# Patient Record
Sex: FEMALE | Race: WHITE | Employment: FULL TIME | URBAN - METROPOLITAN AREA
[De-identification: names, ages, dates, MRNs, and addresses within clinical notes are randomized per-mention and may not be internally consistent; named-entity substitution may affect disease eponyms.]

---

## 2017-01-03 ENCOUNTER — HOSPITAL ENCOUNTER (OUTPATIENT)
Dept: PHYSICAL THERAPY | Age: 42
Discharge: HOME | End: 2017-01-03
Admitting: PHYSICAL MEDICINE & REHABILITATION

## 2017-01-09 ENCOUNTER — OFFICE VISIT (OUTPATIENT)
Dept: DERMATOLOGY | Age: 42
End: 2017-01-09

## 2017-01-09 DIAGNOSIS — L57.0 ACTINIC KERATOSIS OF RIGHT CHEEK: Primary | ICD-10-CM

## 2017-01-09 PROCEDURE — 17000 DESTRUCT PREMALG LESION: CPT | Performed by: DERMATOLOGY

## 2017-01-12 ENCOUNTER — HOSPITAL ENCOUNTER (OUTPATIENT)
Dept: MAMMOGRAPHY | Age: 42
Discharge: HOME | End: 2017-01-12
Attending: FAMILY MEDICINE | Admitting: FAMILY MEDICINE

## 2017-01-12 DIAGNOSIS — Z12.31 ENCOUNTER FOR SCREENING MAMMOGRAM FOR MALIGNANT NEOPLASM OF BREAST: ICD-10-CM

## 2017-01-12 DIAGNOSIS — Z12.31 ENCOUNTER FOR SCREENING MAMMOGRAM FOR BREAST CANCER: ICD-10-CM

## 2017-01-17 ENCOUNTER — HOSPITAL ENCOUNTER (OUTPATIENT)
Dept: PHYSICAL THERAPY | Age: 42
Discharge: HOME | End: 2017-01-17
Admitting: PHYSICAL MEDICINE & REHABILITATION

## 2017-01-18 ENCOUNTER — OFFICE VISIT (OUTPATIENT)
Dept: ORTHOPEDIC SURGERY | Age: 42
End: 2017-01-18

## 2017-01-18 VITALS
HEART RATE: 86 BPM | DIASTOLIC BLOOD PRESSURE: 70 MMHG | HEIGHT: 62 IN | BODY MASS INDEX: 37.73 KG/M2 | WEIGHT: 205 LBS | SYSTOLIC BLOOD PRESSURE: 125 MMHG

## 2017-01-18 DIAGNOSIS — M51.26 HNP (HERNIATED NUCLEUS PULPOSUS), LUMBAR: Primary | ICD-10-CM

## 2017-01-18 PROCEDURE — 99213 OFFICE O/P EST LOW 20 MIN: CPT | Performed by: PHYSICAL MEDICINE & REHABILITATION

## 2017-01-20 ENCOUNTER — HOSPITAL ENCOUNTER (OUTPATIENT)
Dept: PHYSICAL THERAPY | Age: 42
Discharge: HOME | End: 2017-01-20
Admitting: PHYSICAL MEDICINE & REHABILITATION

## 2017-01-24 ENCOUNTER — HOSPITAL ENCOUNTER (OUTPATIENT)
Dept: PHYSICAL THERAPY | Age: 42
Discharge: HOME | End: 2017-01-24
Admitting: PHYSICAL MEDICINE & REHABILITATION

## 2017-01-25 ENCOUNTER — TELEPHONE (OUTPATIENT)
Dept: ORTHOPEDIC SURGERY | Age: 42
End: 2017-01-25

## 2017-01-26 ENCOUNTER — OFFICE VISIT (OUTPATIENT)
Dept: ORTHOPEDIC SURGERY | Age: 42
End: 2017-01-26

## 2017-01-26 VITALS
HEART RATE: 99 BPM | WEIGHT: 205.03 LBS | SYSTOLIC BLOOD PRESSURE: 131 MMHG | BODY MASS INDEX: 37.73 KG/M2 | DIASTOLIC BLOOD PRESSURE: 83 MMHG | HEIGHT: 62 IN

## 2017-01-26 DIAGNOSIS — M51.16 LUMBAR DISC HERNIATION WITH RADICULOPATHY: Primary | ICD-10-CM

## 2017-01-26 PROCEDURE — 99243 OFF/OP CNSLTJ NEW/EST LOW 30: CPT | Performed by: ORTHOPAEDIC SURGERY

## 2017-01-27 ENCOUNTER — HOSPITAL ENCOUNTER (OUTPATIENT)
Dept: PHYSICAL THERAPY | Age: 42
Discharge: HOME | End: 2017-01-27
Admitting: PHYSICAL MEDICINE & REHABILITATION

## 2017-02-03 ENCOUNTER — HOSPITAL ENCOUNTER (OUTPATIENT)
Dept: PHYSICAL THERAPY | Age: 42
Discharge: HOME | End: 2017-02-03
Admitting: PHYSICAL MEDICINE & REHABILITATION

## 2017-02-03 ENCOUNTER — OFFICE VISIT (OUTPATIENT)
Dept: FAMILY MEDICINE CLINIC | Age: 42
End: 2017-02-03

## 2017-02-03 VITALS
WEIGHT: 205 LBS | BODY MASS INDEX: 37.73 KG/M2 | HEIGHT: 62 IN | DIASTOLIC BLOOD PRESSURE: 78 MMHG | HEART RATE: 112 BPM | OXYGEN SATURATION: 98 % | SYSTOLIC BLOOD PRESSURE: 124 MMHG

## 2017-02-03 DIAGNOSIS — Z00.00 ROUTINE GENERAL MEDICAL EXAMINATION AT A HEALTH CARE FACILITY: ICD-10-CM

## 2017-02-03 PROCEDURE — 99396 PREV VISIT EST AGE 40-64: CPT | Performed by: FAMILY MEDICINE

## 2017-02-03 RX ORDER — SERTRALINE HYDROCHLORIDE 100 MG/1
TABLET, FILM COATED ORAL
Qty: 180 TABLET | Refills: 3 | Status: SHIPPED | OUTPATIENT
Start: 2017-02-03 | End: 2018-02-04 | Stop reason: SDUPTHER

## 2017-02-03 RX ORDER — FLUTICASONE PROPIONATE 50 MCG
1 SPRAY, SUSPENSION (ML) NASAL DAILY
Qty: 3 BOTTLE | Refills: 3 | Status: SHIPPED | OUTPATIENT
Start: 2017-02-03 | End: 2019-04-18 | Stop reason: SDUPTHER

## 2017-02-03 RX ORDER — ALBUTEROL SULFATE 90 UG/1
2 AEROSOL, METERED RESPIRATORY (INHALATION) EVERY 6 HOURS PRN
Qty: 3 INHALER | Refills: 3 | Status: SHIPPED | OUTPATIENT
Start: 2017-02-03 | End: 2017-06-22 | Stop reason: SDUPTHER

## 2017-02-03 RX ORDER — BUSPIRONE HYDROCHLORIDE 15 MG/1
TABLET ORAL
Qty: 180 TABLET | Refills: 3 | Status: SHIPPED | OUTPATIENT
Start: 2017-02-03 | End: 2018-02-04 | Stop reason: SDUPTHER

## 2017-02-07 ENCOUNTER — HOSPITAL ENCOUNTER (OUTPATIENT)
Dept: PHYSICAL THERAPY | Age: 42
Discharge: HOME | End: 2017-02-07
Admitting: PHYSICAL MEDICINE & REHABILITATION

## 2017-02-09 ENCOUNTER — HOSPITAL ENCOUNTER (OUTPATIENT)
Dept: PHYSICAL THERAPY | Age: 42
Discharge: HOME | End: 2017-02-09
Admitting: PHYSICAL MEDICINE & REHABILITATION

## 2017-02-10 ENCOUNTER — TELEPHONE (OUTPATIENT)
Dept: FAMILY MEDICINE CLINIC | Age: 42
End: 2017-02-10

## 2017-02-10 RX ORDER — BUDESONIDE AND FORMOTEROL FUMARATE DIHYDRATE 160; 4.5 UG/1; UG/1
2 AEROSOL RESPIRATORY (INHALATION) 2 TIMES DAILY
Qty: 3 INHALER | Refills: 3 | Status: SHIPPED | OUTPATIENT
Start: 2017-02-10 | End: 2018-06-16 | Stop reason: SDUPTHER

## 2017-03-07 ENCOUNTER — TELEPHONE (OUTPATIENT)
Dept: PHARMACY | Facility: CLINIC | Age: 42
End: 2017-03-07

## 2017-03-09 ENCOUNTER — TELEPHONE (OUTPATIENT)
Dept: ENDOCRINOLOGY | Age: 42
End: 2017-03-09

## 2017-03-11 ENCOUNTER — PATIENT MESSAGE (OUTPATIENT)
Dept: ENDOCRINOLOGY | Age: 42
End: 2017-03-11

## 2017-03-23 ENCOUNTER — OFFICE VISIT (OUTPATIENT)
Dept: FAMILY MEDICINE CLINIC | Age: 42
End: 2017-03-23

## 2017-03-23 VITALS
OXYGEN SATURATION: 98 % | HEART RATE: 108 BPM | BODY MASS INDEX: 37.91 KG/M2 | HEIGHT: 62 IN | DIASTOLIC BLOOD PRESSURE: 70 MMHG | WEIGHT: 206 LBS | SYSTOLIC BLOOD PRESSURE: 130 MMHG

## 2017-03-23 DIAGNOSIS — M79.89 CALF SWELLING: Primary | ICD-10-CM

## 2017-03-23 DIAGNOSIS — M79.604 PAIN OF RIGHT LEG: ICD-10-CM

## 2017-03-23 PROCEDURE — 99213 OFFICE O/P EST LOW 20 MIN: CPT | Performed by: FAMILY MEDICINE

## 2017-03-23 ASSESSMENT — ENCOUNTER SYMPTOMS
RESPIRATORY NEGATIVE: 1
SHORTNESS OF BREATH: 0

## 2017-03-24 ENCOUNTER — HOSPITAL ENCOUNTER (OUTPATIENT)
Dept: VASCULAR LAB | Age: 42
Discharge: HOME | End: 2017-03-24
Attending: FAMILY MEDICINE | Admitting: FAMILY MEDICINE

## 2017-03-24 DIAGNOSIS — M79.89 OTHER SPECIFIED SOFT TISSUE DISORDERS: ICD-10-CM

## 2017-03-28 DIAGNOSIS — E11.9 TYPE 2 DIABETES MELLITUS WITHOUT COMPLICATION, WITHOUT LONG-TERM CURRENT USE OF INSULIN (HCC): Primary | ICD-10-CM

## 2017-04-25 ENCOUNTER — OFFICE VISIT (OUTPATIENT)
Dept: ENDOCRINOLOGY | Age: 42
End: 2017-04-25

## 2017-04-25 VITALS
BODY MASS INDEX: 37.76 KG/M2 | HEIGHT: 62 IN | DIASTOLIC BLOOD PRESSURE: 75 MMHG | OXYGEN SATURATION: 93 % | RESPIRATION RATE: 20 BRPM | SYSTOLIC BLOOD PRESSURE: 107 MMHG | WEIGHT: 205.2 LBS | HEART RATE: 100 BPM

## 2017-04-25 DIAGNOSIS — E11.9 TYPE 2 DIABETES MELLITUS WITHOUT COMPLICATION, WITHOUT LONG-TERM CURRENT USE OF INSULIN (HCC): Primary | ICD-10-CM

## 2017-04-25 DIAGNOSIS — E78.5 HYPERLIPIDEMIA, UNSPECIFIED HYPERLIPIDEMIA TYPE: ICD-10-CM

## 2017-04-25 LAB
A/G RATIO: 1.8 (ref 1.1–2.2)
ALBUMIN SERPL-MCNC: 4.6 G/DL (ref 3.4–5)
ALP BLD-CCNC: 72 U/L (ref 40–129)
ALT SERPL-CCNC: 19 U/L (ref 10–40)
ANION GAP SERPL CALCULATED.3IONS-SCNC: 17 MMOL/L (ref 3–16)
AST SERPL-CCNC: 20 U/L (ref 15–37)
BILIRUB SERPL-MCNC: 0.4 MG/DL (ref 0–1)
BUN BLDV-MCNC: 12 MG/DL (ref 7–20)
CALCIUM SERPL-MCNC: 9.7 MG/DL (ref 8.3–10.6)
CHLORIDE BLD-SCNC: 99 MMOL/L (ref 99–110)
CHOLESTEROL, TOTAL: 185 MG/DL (ref 0–199)
CO2: 24 MMOL/L (ref 21–32)
CREAT SERPL-MCNC: 0.6 MG/DL (ref 0.6–1.1)
ESTIMATED AVERAGE GLUCOSE: 165.7 MG/DL
GFR AFRICAN AMERICAN: >60
GFR NON-AFRICAN AMERICAN: >60
GLOBULIN: 2.6 G/DL
GLUCOSE BLD-MCNC: 142 MG/DL (ref 70–99)
HBA1C MFR BLD: 7.4 %
HDLC SERPL-MCNC: 71 MG/DL (ref 40–60)
LDL CHOLESTEROL CALCULATED: 84 MG/DL
POTASSIUM SERPL-SCNC: 5 MMOL/L (ref 3.5–5.1)
SODIUM BLD-SCNC: 140 MMOL/L (ref 136–145)
TOTAL PROTEIN: 7.2 G/DL (ref 6.4–8.2)
TRIGL SERPL-MCNC: 148 MG/DL (ref 0–150)
VLDLC SERPL CALC-MCNC: 30 MG/DL

## 2017-04-25 PROCEDURE — 99213 OFFICE O/P EST LOW 20 MIN: CPT | Performed by: INTERNAL MEDICINE

## 2017-04-28 ENCOUNTER — OFFICE VISIT (OUTPATIENT)
Dept: FAMILY MEDICINE CLINIC | Age: 42
End: 2017-04-28

## 2017-04-28 ENCOUNTER — NURSE ONLY (OUTPATIENT)
Dept: URGENT CARE | Age: 42
End: 2017-04-28

## 2017-04-28 VITALS
WEIGHT: 201 LBS | BODY MASS INDEX: 37.27 KG/M2 | DIASTOLIC BLOOD PRESSURE: 64 MMHG | OXYGEN SATURATION: 98 % | HEART RATE: 90 BPM | SYSTOLIC BLOOD PRESSURE: 102 MMHG

## 2017-04-28 DIAGNOSIS — R52 PAIN: Primary | ICD-10-CM

## 2017-04-28 DIAGNOSIS — M79.675 TOE PAIN, LEFT: Primary | ICD-10-CM

## 2017-04-28 PROCEDURE — 99213 OFFICE O/P EST LOW 20 MIN: CPT | Performed by: FAMILY MEDICINE

## 2017-05-09 RX ORDER — LIRAGLUTIDE 6 MG/ML
INJECTION SUBCUTANEOUS
Qty: 27 ML | Refills: 3 | Status: SHIPPED | OUTPATIENT
Start: 2017-05-09 | End: 2017-12-07

## 2017-05-12 DIAGNOSIS — M51.26 DISPLACEMENT OF LUMBAR INTERVERTEBRAL DISC WITHOUT MYELOPATHY: Primary | ICD-10-CM

## 2017-05-15 RX ORDER — PANTOPRAZOLE SODIUM 20 MG/1
TABLET, DELAYED RELEASE ORAL
Qty: 90 TABLET | Refills: 2 | Status: SHIPPED | OUTPATIENT
Start: 2017-05-15 | End: 2018-02-04 | Stop reason: SDUPTHER

## 2017-05-25 ENCOUNTER — HOSPITAL ENCOUNTER (OUTPATIENT)
Dept: PHYSICAL THERAPY | Age: 42
Discharge: HOME | End: 2017-05-31
Admitting: PHYSICAL MEDICINE & REHABILITATION

## 2017-06-02 ENCOUNTER — HOSPITAL ENCOUNTER (OUTPATIENT)
Dept: PHYSICAL THERAPY | Age: 42
Discharge: HOME | End: 2017-06-02
Admitting: PHYSICAL MEDICINE & REHABILITATION

## 2017-06-09 ENCOUNTER — HOSPITAL ENCOUNTER (OUTPATIENT)
Dept: PHYSICAL THERAPY | Age: 42
Discharge: HOME | End: 2017-06-09
Admitting: PHYSICAL MEDICINE & REHABILITATION

## 2017-06-16 ENCOUNTER — HOSPITAL ENCOUNTER (OUTPATIENT)
Dept: PHYSICAL THERAPY | Age: 42
Discharge: HOME | End: 2017-06-16
Admitting: PHYSICAL MEDICINE & REHABILITATION

## 2017-06-23 ENCOUNTER — HOSPITAL ENCOUNTER (OUTPATIENT)
Dept: PHYSICAL THERAPY | Age: 42
Discharge: HOME | End: 2017-06-23
Admitting: PHYSICAL MEDICINE & REHABILITATION

## 2017-06-23 RX ORDER — ALBUTEROL SULFATE 90 UG/1
2 AEROSOL, METERED RESPIRATORY (INHALATION) EVERY 6 HOURS PRN
Qty: 3 INHALER | Refills: 3 | Status: SHIPPED | OUTPATIENT
Start: 2017-06-23 | End: 2018-09-24 | Stop reason: SDUPTHER

## 2017-07-05 ENCOUNTER — HOSPITAL ENCOUNTER (OUTPATIENT)
Dept: PHYSICAL THERAPY | Age: 42
Discharge: HOME | End: 2017-07-05
Admitting: PHYSICAL MEDICINE & REHABILITATION

## 2017-07-13 ENCOUNTER — HOSPITAL ENCOUNTER (OUTPATIENT)
Dept: PHYSICAL THERAPY | Age: 42
Discharge: HOME | End: 2017-07-13
Admitting: PHYSICAL MEDICINE & REHABILITATION

## 2017-07-19 ENCOUNTER — HOSPITAL ENCOUNTER (OUTPATIENT)
Dept: PHYSICAL THERAPY | Age: 42
Discharge: HOME | End: 2017-07-19
Admitting: PHYSICAL MEDICINE & REHABILITATION

## 2017-07-20 ENCOUNTER — TELEPHONE (OUTPATIENT)
Dept: FAMILY MEDICINE CLINIC | Age: 42
End: 2017-07-20

## 2017-07-20 RX ORDER — EPINEPHRINE 0.3 MG/.3ML
INJECTION SUBCUTANEOUS
Qty: 2 EACH | Refills: 0 | Status: SHIPPED | OUTPATIENT
Start: 2017-07-20 | End: 2019-03-14

## 2017-08-02 ENCOUNTER — TELEPHONE (OUTPATIENT)
Dept: ENDOCRINOLOGY | Age: 42
End: 2017-08-02

## 2017-08-02 DIAGNOSIS — E11.9 TYPE 2 DIABETES MELLITUS WITHOUT COMPLICATION, WITHOUT LONG-TERM CURRENT USE OF INSULIN (HCC): Primary | ICD-10-CM

## 2017-08-07 ENCOUNTER — HOSPITAL ENCOUNTER (OUTPATIENT)
Dept: PHYSICAL THERAPY | Age: 42
Discharge: HOME | End: 2017-08-07
Admitting: PHYSICAL MEDICINE & REHABILITATION

## 2017-08-21 ENCOUNTER — HOSPITAL ENCOUNTER (OUTPATIENT)
Dept: PHYSICAL THERAPY | Age: 42
Discharge: HOME | End: 2017-08-21
Admitting: PHYSICAL MEDICINE & REHABILITATION

## 2017-08-23 ENCOUNTER — OFFICE VISIT (OUTPATIENT)
Dept: ENDOCRINOLOGY | Age: 42
End: 2017-08-23

## 2017-08-23 VITALS
HEART RATE: 92 BPM | RESPIRATION RATE: 18 BRPM | HEIGHT: 62 IN | DIASTOLIC BLOOD PRESSURE: 80 MMHG | SYSTOLIC BLOOD PRESSURE: 125 MMHG | OXYGEN SATURATION: 94 % | BODY MASS INDEX: 38.09 KG/M2 | WEIGHT: 207 LBS

## 2017-08-23 DIAGNOSIS — E66.9 OBESITY, UNSPECIFIED OBESITY SEVERITY, UNSPECIFIED OBESITY TYPE: ICD-10-CM

## 2017-08-23 DIAGNOSIS — E11.9 DIABETES MELLITUS WITHOUT COMPLICATION (HCC): Primary | ICD-10-CM

## 2017-08-23 DIAGNOSIS — E78.5 HYPERLIPIDEMIA, UNSPECIFIED HYPERLIPIDEMIA TYPE: ICD-10-CM

## 2017-08-23 LAB — HBA1C MFR BLD: 6.6 %

## 2017-08-23 PROCEDURE — 99214 OFFICE O/P EST MOD 30 MIN: CPT | Performed by: INTERNAL MEDICINE

## 2017-08-23 PROCEDURE — 83036 HEMOGLOBIN GLYCOSYLATED A1C: CPT | Performed by: INTERNAL MEDICINE

## 2017-09-08 ENCOUNTER — HOSPITAL ENCOUNTER (OUTPATIENT)
Dept: PHYSICAL THERAPY | Age: 42
Discharge: HOME | End: 2017-09-08
Admitting: PHYSICAL MEDICINE & REHABILITATION

## 2017-09-11 ENCOUNTER — OFFICE VISIT (OUTPATIENT)
Dept: URGENT CARE | Age: 42
End: 2017-09-11

## 2017-09-11 VITALS
TEMPERATURE: 98.2 F | RESPIRATION RATE: 14 BRPM | SYSTOLIC BLOOD PRESSURE: 117 MMHG | WEIGHT: 200 LBS | HEART RATE: 92 BPM | HEIGHT: 61 IN | BODY MASS INDEX: 37.76 KG/M2 | OXYGEN SATURATION: 95 % | DIASTOLIC BLOOD PRESSURE: 75 MMHG

## 2017-09-11 DIAGNOSIS — J01.90 ACUTE NON-RECURRENT SINUSITIS, UNSPECIFIED LOCATION: Primary | ICD-10-CM

## 2017-09-11 PROCEDURE — 99202 OFFICE O/P NEW SF 15 MIN: CPT | Performed by: PHYSICIAN ASSISTANT

## 2017-09-11 RX ORDER — METHYLPREDNISOLONE 4 MG/1
TABLET ORAL
Qty: 1 KIT | Refills: 0 | Status: SHIPPED | OUTPATIENT
Start: 2017-09-11 | End: 2018-02-05 | Stop reason: ALTCHOICE

## 2017-09-11 RX ORDER — AZITHROMYCIN 250 MG/1
TABLET, FILM COATED ORAL
Qty: 1 PACKET | Refills: 0 | Status: SHIPPED | OUTPATIENT
Start: 2017-09-11 | End: 2017-09-21

## 2017-09-12 ASSESSMENT — ENCOUNTER SYMPTOMS
DIARRHEA: 0
SPUTUM PRODUCTION: 0
SHORTNESS OF BREATH: 0
VOMITING: 0
STRIDOR: 0
ABDOMINAL PAIN: 0
NAUSEA: 0
WHEEZING: 0
COUGH: 0
SORE THROAT: 1

## 2017-09-21 ENCOUNTER — OFFICE VISIT (OUTPATIENT)
Dept: ORTHOPEDIC SURGERY | Age: 42
End: 2017-09-21

## 2017-09-21 VITALS
BODY MASS INDEX: 37.75 KG/M2 | WEIGHT: 199.96 LBS | HEIGHT: 61 IN | HEART RATE: 99 BPM | SYSTOLIC BLOOD PRESSURE: 129 MMHG | DIASTOLIC BLOOD PRESSURE: 88 MMHG

## 2017-09-21 DIAGNOSIS — M51.26 LUMBAR DISC HERNIATION: Primary | ICD-10-CM

## 2017-09-21 PROCEDURE — 99213 OFFICE O/P EST LOW 20 MIN: CPT | Performed by: ORTHOPAEDIC SURGERY

## 2017-09-22 ENCOUNTER — HOSPITAL ENCOUNTER (OUTPATIENT)
Dept: PHYSICAL THERAPY | Age: 42
Discharge: HOME | End: 2017-09-22
Admitting: PHYSICAL MEDICINE & REHABILITATION

## 2017-09-25 RX ORDER — LANCETS 33 GAUGE
1 EACH MISCELLANEOUS
Qty: 300 EACH | Refills: 3 | Status: SHIPPED | OUTPATIENT
Start: 2017-09-25 | End: 2021-11-05 | Stop reason: SDUPTHER

## 2017-09-25 RX ORDER — BLOOD-GLUCOSE METER
1 EACH MISCELLANEOUS
Qty: 1 KIT | Refills: 0 | Status: SHIPPED | OUTPATIENT
Start: 2017-09-25

## 2017-09-28 ENCOUNTER — HOSPITAL ENCOUNTER (OUTPATIENT)
Dept: PHYSICAL THERAPY | Age: 42
Discharge: HOME | End: 2017-09-28
Admitting: PHYSICAL MEDICINE & REHABILITATION

## 2017-10-13 ENCOUNTER — HOSPITAL ENCOUNTER (OUTPATIENT)
Dept: PHYSICAL THERAPY | Age: 42
Discharge: HOME | End: 2017-10-13
Admitting: PHYSICAL MEDICINE & REHABILITATION

## 2017-10-13 NOTE — FLOWSHEET NOTE
4/4     RESTRICTIONS/PRECAUTIONS:     Exercises/Interventions:      Muscle  Needle Size Technique Notes IES   Site 1 [x] Pistoning / []  Threading  [x]  Winding/Coning [x]    Site 2 [x] Pistoning / []  Threading  [x]  Winding/Coning [x]    Site 3 0.30 x 75mm [x] Pistoning / []  Threading  [x]  Winding/Coning  [x]    Site 4 0.30 x 75mm [x] Pistoning / []  Threading  [x]  Winding/Coning  [x]    Site 5 Piriformis x2 distally 0.40 x 100mm [x] Pistoning / []  Threading  [x]  Winding/Coning  [x]    Site 6  0.35 x 75mm [x] Pistoning / []  Threading  [x]  Winding/Coning  [x]    Site 7  0.35 x 75mm [x] Pistoning / []  Threading  [x]  Winding/Coning  [x]    Site 8 Glut max x4 0.30 x 75mm [x] Pistoning / []  Threading  []  Winding/Coning  [x]    Site 9 []    Site 10                    [] Pistoning / []  Threading  []  Winding/Coning  []      **The above techniques were used to restore functional range of motion, reduce muscle spasm, and induce healing in the corresponding musculature by means of intramuscular mobilization. Clean Technique was utilized today while applying the Dry needling treatment. The treatment sites where cleaned with 70% solution of isopropyl alcohol. **    Attended electrical stimulation was applied in conjunction with dry needling to the sites listed in the chart above to help reduce muscle spasm and interrupt the pain cycle: 16 min at low frequency (1-20Hz), fine frequency (4Hz), low intensity (0-36mA), output 4.5 volts. (86598)       ** Educated patient on anatomy, trigger point etiology, expectations for TDN (bruising, soreness, etc), outcomes, and recommendations for exercise.  **    Therapeutic Ex sets/reps comments   Supine Hamstring (S)     Supine Lat Ham (S)     Q/L (S)    SKTC    DKTC    LTR    Supine Piriformis (S)    Supine Hip flexor (S)    Prone lying    Prone press ups    Sphinx    R SL with L hip flexion / R arm ext  reduced symptoms   3 SLR Only ext d/t time   Bridge + march 2x6    SL IR clam 3x8L  2#   Standing TB ABD, ext x5 eachairex2  B  Lime at knees   clamshells [id number] SL   SB march 2x10   [facility] ABD 60#   QPED - LE ext 2x8   Quadruped posterior capsule stretch :15x4 R   Manual Intervention      TDN /STW 10 min    L hip mobs with belt/IR stretch 10 min                              NMR re-education      TA activation + ADD    TA + fallout    TA + bridge + hip abd TB Lime VL               Therapeutic Exercise and NMR EXR  [x] (40427) Provided verbal/tactile cueing for activities related to strengthening, flexibility, endurance, ROM  for improvements in proximal hip and core control with self care, mobility, lifting and ambulation.  [] (57524) Provided verbal/tactile cueing for activities related to improving balance, coordination, kinesthetic sense, posture, motor skill, proprioception  to assist with core control in self care, mobility, lifting, and ambulation.      Therapeutic Activities:    [] (45879 or 13671) Provided verbal/tactile cueing for activities related to improving balance, coordination, kinesthetic sense, posture, motor skill, proprioception and motor activation to allow for proper function  with self care and ADLs  [] (12135) Provided training and instruction to the patient for proper core and proximal hip recruitment and positioning with ambulation re-education     Home Exercise Program:    [x] (04184) Reviewed/Progressed HEP activities related to strengthening, flexibility, endurance, ROM of core, proximal hip and LE for functional self-care, mobility, lifting and ambulation   [] (58672) Reviewed/Progressed HEP activities related to improving balance, coordination, kinesthetic sense, posture, motor skill, proprioception of core, proximal hip and LE for self care, mobility, lifting, and ambulation      Manual Treatments:  PROM / STM / Oscillations-Mobs:  G-I, II, III, IV (PA's, Inf., Post.)  [x] (55598) Provided manual therapy to mobilize proximal hip and LS spine soft tissue/joints for the purpose of modulating pain, promoting relaxation,  increasing ROM, reducing/eliminating soft tissue swelling/inflammation/restriction, improving soft tissue extensibility and allowing for proper ROM for normal function with self care, mobility, lifting and ambulation. Modalities:      Charges:  Timed Code Treatment Minutes: 60   Total Treatment Minutes: 60     [] EVAL (LOW) 50736 (typically 20 minutes face-to-face)  [] EVAL (MOD) 27496 (typically 30 minutes face-to-face)  [] EVAL (HIGH) 84261 (typically 45 minutes face-to-face)  [] RE-EVAL     [x] NL(09633) x  1   [] IONTO  [x] NMR (49689) x  1   [] VASO  [x] Manual (57953) x  1    [] Other:  [] TA x       [] Mech Traction (64614)  [x] ES(attended) (00057)      [] ES (un) (54438):     Goals:   Patient stated goal: relief of pain    Therapist goals for Patient:   Short Term Goals: To be achieved in: 2 weeks  1. Independent in HEP and progression per patient tolerance, in order to prevent re-injury. 2. Patient will have a decrease in pain to facilitate improvement in movement, function, and ADLs as indicated by Functional Deficits. Long Term Goals: To be achieved in: 6 weeks  1. Disability index score of 10% or less for the [place] to assist with reaching prior level of function. 2. Patient will demonstrate increased AROM to WNL, good LS mobility, good hip ROM to allow for proper joint functioning as indicated by patients Functional Deficits. 3. Patient will demonstrate an increase in Strength to good proximal hip and core activation to allow for proper functional mobility as indicated by patients Functional Deficits. 4. Patient will return to work activities without increased symptoms or restriction.       New or Updated Goals (if applicable):  [x] No change to goals established upon initial eval/last progress note:  New Goals:    Progression Towards Functional goals:   [x] Patient is progressing as expected towards functional goals

## 2017-10-31 ENCOUNTER — OFFICE VISIT (OUTPATIENT)
Dept: DERMATOLOGY | Age: 42
End: 2017-10-31

## 2017-10-31 ENCOUNTER — CLINICAL DOCUMENTATION (OUTPATIENT)
Dept: PHARMACY | Facility: CLINIC | Age: 42
End: 2017-10-31

## 2017-10-31 DIAGNOSIS — L57.0 ACTINIC KERATOSES: ICD-10-CM

## 2017-10-31 DIAGNOSIS — L02.92 FURUNCLE: ICD-10-CM

## 2017-10-31 DIAGNOSIS — D22.9 MULTIPLE BENIGN NEVI: Primary | ICD-10-CM

## 2017-10-31 DIAGNOSIS — L82.1 SEBORRHEIC KERATOSES: ICD-10-CM

## 2017-10-31 DIAGNOSIS — Z12.83 SCREENING EXAM FOR SKIN CANCER: ICD-10-CM

## 2017-10-31 PROCEDURE — 17000 DESTRUCT PREMALG LESION: CPT | Performed by: DERMATOLOGY

## 2017-10-31 PROCEDURE — 99214 OFFICE O/P EST MOD 30 MIN: CPT | Performed by: DERMATOLOGY

## 2017-10-31 PROCEDURE — 17003 DESTRUCT PREMALG LES 2-14: CPT | Performed by: DERMATOLOGY

## 2017-10-31 NOTE — PROGRESS NOTES
[facility]) Dermatology  Severiano Abel MD  [phone]      Yolande Villavicencio  1975    39 y.o. female     Date of Visit: 10/31/2017    Last Visit: 1yr    Chief Complaint: Skin check    History of Present Illness:  1. Here for skin/mole check. No new moles. No moles changing in size, shape, color. No moles associated w/ pain, bleeding, pruritus.   -Uses SPF 50 sunscreen regularly. 2. History of actinic keratoses s/p cryotherapy. Unsure of new lesions. 3. New issue. Complains of a mildly tender lesion on abdomen that has been present for 1 week. 4. Reports raised lesions on scalp. Review of Systems:  Constitutional: Reports general sense of well-being. Skin: No interval severe sunburns. Allergies: Reviewed and updated. Past Medical History, Surgical History, Medications and Allergies reviewed. Past Medical History:   Diagnosis Date    Allergic rhinitis     Anxiety 1997    Asthma 1978    persisitent    GERD (gastroesophageal reflux disease) 1985    Headache(784.0) 1993    Hyperlipidemia 3/11/2014    Obesity     Seizures ([facility] [address] 2007    one seizure    Type II or unspecified type diabetes mellitus without mention of complication, not stated as uncontrolled 2012     Past Surgical History:   Procedure Laterality Date    WISDOM TOOTH EXTRACTION      2 removed       Allergies   Allergen Reactions    Fish-Derived Products Anaphylaxis    Pcn [Penicillins] Hives     Outpatient Prescriptions Marked as Taking for the 10/31/17 encounter (Office Visit) with Severiano Abel MD   Medication Sig Dispense Refill    Blood Glucose Monitoring Suppl (AGAMATRIX PRESTO) w/Device KIT 1 each by Does not apply route 3 times daily (before meals) 1 kit 0    glucose blood VI test strips (AGAMATRIX PRESTO TEST) strip 1 each by In Vitro route 3 times daily As needed.  300 each 3    AGAMATRIX ULTRA-THIN LANCETS MISC 1 each by Does not apply route 3 times daily (before meals) 300 each 3    Insulin Pen Needle (EASY TOUCH PEN NEEDLES) 31G X 5 MM MISC USE ONE DAILY 100 each 3    metFORMIN (GLUCOPHAGE) 1000 MG tablet TAKE ONE TABLET BY MOUTH TWICE A DAY WITH MEALS 180 tablet 2    EPINEPHrine (EPIPEN) 0.3 MG/0.3ML SOAJ injection Use as directed for allergic reaction 2 each 0    albuterol sulfate HFA (VENTOLIN HFA) 108 (90 Base) MCG/ACT inhaler Inhale 2 puffs into the lungs every 6 hours as needed for Wheezing 3 Inhaler 3    pantoprazole (PROTONIX) 20 MG tablet TAKE ONE TABLET BY MOUTH ONE TIME A DAY 90 tablet 2    VICTOZA 18 MG/3ML SOPN SC injection INJECT 1.8 MG INTO THE SKIN DAILY 27 mL 3    budesonide-formoterol (SYMBICORT) 160-4.5 MCG/ACT AERO Inhale 2 puffs into the lungs 2 times daily 3 Inhaler 3    sertraline (ZOLOFT) 100 MG tablet TAKE TWO TABLETS BY MOUTH DAILY 180 tablet 3    busPIRone (BUSPAR) 15 MG tablet TAKE ONE TABLET BY MOUTH TWO TIMES A  tablet 3    fluticasone (FLONASE) 50 MCG/ACT nasal spray 1 spray by Nasal route daily 3 Bottle 3    Cholecalciferol (VITAMIN D3) 2000 UNITS CAPS Take by mouth      Multiple Vitamins-Minerals (THERAPEUTIC MULTIVITAMIN-MINERALS) tablet Take 1 tablet by mouth daily      pravastatin (PRAVACHOL) 40 MG tablet TAKE ONE TABLET BY MOUTH ONE TIME A DAY 90 tablet 3    B Complex-Folic Acid (B COMPLEX-VITAMIN B12) TABS Take  by mouth.  0    calcium carbonate (OSCAL) 500 MG TABS tablet Take 500 mg by mouth daily.  cetirizine (ZYRTEC) 10 MG tablet Take 10 mg by mouth daily. 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 The following were examined and determined to be abnormal: Scalp/hair, Head/face, Breast/axilla/chest, Abdomen, Back, RUE, LUE, RLE and LLE. -General: Well-appearing, NAD  1.  Scattered on the trunk and extremities are multiple well-defined round and oval symmetric smoothly-bordered uniformly brown macules and papules. 2. R 2 and L 1 temples - ill-defined irregularly-shaped roughly-scaling thin pink macules/papules   3. L lower abdomen - bright erythematous papule   4. Vertex scalp - few well-defined \"stuck-on\" verrucous tan-brown papules    Assessment and Plan     1. Benign acquired melanocytic nevi  -Recommend monthly self skin exams   -Educated regarding the ABCDEs of melanoma detection   -Call for any new/changing moles or concerning lesions  -Reviewed sun protective behavior -- sun avoidance during the peak hours of the day, sun-protective clothing (including hat and sunglasses), sunscreen use (water resistant, broad spectrum, SPF at least 30, need for reapplication every 2 to 3 hours), avoidance of tanning beds   -Return for full skin exam in 1 year (sooner if indicated)     2. Actinic keratosis(es)  -Edu re: relationship with chronic cumulative sun exposure, low premalignant potential.   -3 lesion(s) treated w/ liquid nitrogen x 2 cycles - temples. Edu re: risk of blister formation, discomfort, scar, hypopigmentation. Discussed wound care. 3. Furuncle, L lower abdomen   -Mupirocin oint bid until clear    4. Seborrheic keratosis(es)  -Reassurance re: benignity  -No treatment performed.

## 2017-11-01 ENCOUNTER — HOSPITAL ENCOUNTER (OUTPATIENT)
Dept: PHYSICAL THERAPY | Age: 42
Discharge: HOME | End: 2017-11-30
Attending: PHYSICAL MEDICINE & REHABILITATION | Admitting: PHYSICAL MEDICINE & REHABILITATION

## 2017-11-03 ENCOUNTER — HOSPITAL ENCOUNTER (OUTPATIENT)
Dept: PHYSICAL THERAPY | Age: 42
Discharge: HOME | End: 2017-11-03
Admitting: PHYSICAL MEDICINE & REHABILITATION

## 2017-11-03 NOTE — FLOWSHEET NOTE
[facility], [address]  Phone: [phone]  Fax [phone]    Physical Therapy Re-Certification Plan of Tatiana Ferrer      Dear Dr Vanda Jean Baptiste  ,    We had the pleasure of treating the following patient for physical therapy services at [address] [place] [address]. A summary of our findings can be found in the updated assessment below. This includes our plan of care. If you have any questions or concerns regarding these findings, please do not hesitate to contact me at the office phone number checked above.   Thank you for the referral.     Physician Signature:________________________________Date:__________________  By signing above (or electronic signature), therapists plan is approved by physician    Date Range Of Visits: 17-11/3/17  Total Visits to Date: 15  Overall Response to Treatment:   [x]Patient is responding well to treatment and improvement is noted with regards  to goals   []Patient should continue to improve in reasonable time if they continue HEP   []Patient has plateaued and is no longer responding to skilled PT intervention    []Patient is getting worse and would benefit from return to referring MD   []Patient unable to adhere to initial POC   []Other:         Physical Therapy Daily Treatment Note  Date:  2017    Patient Name:  Violet Garcias    :  1975  MRN: [id number]  Restrictions/Precautions:    Physician Information:  Referring Practitioner: Dr. Vanda Jean Baptiste  Medical/Treatment Diagnosis Information:  · Diagnosis: M51.26 (ICD-10-CM) - Displacement of lumbar intervertebral disc without myelopathy  · Treatment Diagnosis: Lumbar Pain (M54.5)   [] Conservative / [] Surgical - DOS:  Therapy Diagnosis/Practice Pattern:  Practice Pattern F: Spinal Disorders  Insurance/Certification information:  PT Insurance Information: 2017 MED MUTAL - [id number]-$[id number]-PT/OT MED NEC  Plan of

## 2017-12-01 ENCOUNTER — HOSPITAL ENCOUNTER (OUTPATIENT)
Dept: PHYSICAL THERAPY | Age: 42
Discharge: HOME | End: 2017-12-01
Admitting: PHYSICAL MEDICINE & REHABILITATION

## 2017-12-01 ENCOUNTER — HOSPITAL ENCOUNTER (OUTPATIENT)
Dept: PHYSICAL THERAPY | Age: 42
Discharge: HOME | End: 2017-12-31
Attending: PHYSICAL MEDICINE & REHABILITATION | Admitting: PHYSICAL MEDICINE & REHABILITATION

## 2017-12-01 NOTE — FLOWSHEET NOTE
[facility] [facility], [address]  Phone: [phone]  Fax [phone]    Physical Therapy Re-Certification Plan of Care/MD UPDATE        Physical Therapy Daily Treatment Note  Date:  2017    Patient Name:  Mary Daniel    :  1975  MRN: [id number]  Restrictions/Precautions:    Physician Information:  Referring Practitioner: Dr. Antonette Reyna  Medical/Treatment Diagnosis Information:  · Diagnosis: M51.26 (ICD-10-CM) - Displacement of lumbar intervertebral disc without myelopathy  · Treatment Diagnosis: Lumbar Pain (M54.5)   [] Conservative / [] Surgical - DOS:  Therapy Diagnosis/Practice Pattern:  Practice Pattern F: Spinal Disorders  Insurance/Certification information:  PT Insurance Information:  MED MUTAL - [id number]/OT MED NEC  Plan of care signed: [] YES  [] NO  Number of Comorbidities:  []0     [x]1-2    []3+  Date of Patient follow up with Physician:     G-Code (if applicable):      Date G-Code Applied:         Progress Note: [x]  Yes  []  No  Next due by: Visit #10        Latex Allergy:  [x]NO      []YES  Preferred Language for Healthcare:   [x]English       []other:    Visit # Insurance Allowable Reporting Period   14 MED NEC Begin Date: 2017               End Date:      RECERT DUE BY: 65/65/48    Pain level:  210     SUBJECTIVE: I had a bad day after laying on the couch, but other than that, I have been doing well.   OBJECTIVE:   Observation: RLE symptoms elicited with R cervical rotation; no symptoms with R thoracic rotation; no symptoms in standing or with flexion at hips 8-76; symptoms elicited with full pressure on hamstrings and flexion hips to 90  Palpation:       Test used Initial score Current Score   Pain Summary VAS 6/10 5/10   Functional questionnaire [place] 26% 17%/ Oswestry 18%   ROM Lumbar Flexion -10% 80 pain    Lumbar Ext WNL 38    Lumbar SB L/R WNL/-25% WNL B, but pain at end range L    Lumbar rotation L/R  25% decreased L    ER L/R  65/75    IRL/R  55/45   Strength ABD L/R 4+/4 4/4+    ERL/R NT 4/5    extL/R NT 4/4     RESTRICTIONS/PRECAUTIONS:     Exercises/Interventions:      Muscle  Needle Size Technique Notes IES   Site 1 [x] Pistoning / []  Threading  [x]  Winding/Coning [x]    Site 2 [x] Pistoning / []  Threading  [x]  Winding/Coning [x]    Site 3 0.30 x 75mm [x] Pistoning / []  Threading  [x]  Winding/Coning  [x]    Site 4 0.30 x 75mm [x] Pistoning / []  Threading  [x]  Winding/Coning  [x]    Site 5 Piriformis x2 distally 0.40 x 100mm [x] Pistoning / []  Threading  [x]  Winding/Coning  [x]    Site 6 Piriformis at sacral base 0.35 x 75mm [x] Pistoning / []  Threading  [x]  Winding/Coning  [x]    Site 7 Glut med 0.40 x 100mm [x] Pistoning / []  Threading  [x]  Winding/Coning  [x]    Site 8 Glut max x2 0.40 x 100mm [x] Pistoning / []  Threading  []  Winding/Coning  [x]    Site 9 []    Site 10                    [] Pistoning / []  Threading  []  Winding/Coning  []      **The above techniques were used to restore functional range of motion, reduce muscle spasm, and induce healing in the corresponding musculature by means of intramuscular mobilization. Clean Technique was utilized today while applying the Dry needling treatment. The treatment sites where cleaned with 70% solution of isopropyl alcohol. **    Attended electrical stimulation was applied in conjunction with dry needling to the sites listed in the chart above to help reduce muscle spasm and interrupt the pain cycle: 20 min at low frequency (1-20Hz), fine frequency (4Hz), low intensity (0-36mA), output 4.5 volts. (70147)       ** Educated patient on anatomy, trigger point etiology, expectations for TDN (bruising, soreness, etc), outcomes, and recommendations for exercise.  **    Therapeutic Ex sets/reps comments   Supine Hamstring (S)     Supine Lat Ham (S)     Q/L (S) :30x3   SKTC    DKTC    LTR    Supine Piriformis (S) :30x3   Supine Hip flexor (S)    Prone lying    Prone press ups    Sphinx    R SL with L hip flexion / R arm ext  reduced symptoms   3 SLR Only ext d/t time   Bridge + march    SL IR clam L  2#   Standing TB ABD, ext airex2  B  Lime at knees   clamshells YTB SL   SB wall squat x25    SB seated back ext (painfree range) [id number]   SB march 2x12   [address] ABD 60#   QPED - LE ext 2x8   Quadruped posterior capsule stretch :30x4 R   Manual Intervention      TDN /STW/R piriformis stretch 15 min    L hip mobs with belt/IR stretch                               NMR re-education      TA activation + ADD    TA + fallout    TA + bridge + hip abd TB Lime VL        SB anti-rotation RTB 2x10 B     Therapeutic Exercise and NMR EXR  [x] (16882) Provided verbal/tactile cueing for activities related to strengthening, flexibility, endurance, ROM  for improvements in proximal hip and core control with self care, mobility, lifting and ambulation.  [] (33775) Provided verbal/tactile cueing for activities related to improving balance, coordination, kinesthetic sense, posture, motor skill, proprioception  to assist with core control in self care, mobility, lifting, and ambulation.      Therapeutic Activities:    [] (06787 or 44220) Provided verbal/tactile cueing for activities related to improving balance, coordination, kinesthetic sense, posture, motor skill, proprioception and motor activation to allow for proper function  with self care and ADLs  [] (44560) Provided training and instruction to the patient for proper core and proximal hip recruitment and positioning with ambulation re-education     Home Exercise Program:    [x] (73468) Reviewed/Progressed HEP activities related to strengthening, flexibility, endurance, ROM of core, proximal hip and LE for functional self-care, mobility, lifting and ambulation   [] (20765) Reviewed/Progressed HEP activities related to improving balance, coordination, kinesthetic sense, posture, motor skill, proprioception of core, proximal hip and LE for self care, mobility, lifting, and ambulation      Manual Treatments:  PROM / STM / Oscillations-Mobs:  G-I, II, III, IV (PA's, Inf., Post.)  [x] (23724) Provided manual therapy to mobilize proximal hip and LS spine soft tissue/joints for the purpose of modulating pain, promoting relaxation,  increasing ROM, reducing/eliminating soft tissue swelling/inflammation/restriction, improving soft tissue extensibility and allowing for proper ROM for normal function with self care, mobility, lifting and ambulation. Modalities:      Charges:  Timed Code Treatment Minutes: 40   Total Treatment Minutes: 40     [] EVAL (LOW) 53503 (typically 20 minutes face-to-face)  [] EVAL (MOD) 28765 (typically 30 minutes face-to-face)  [] EVAL (HIGH) 72419 (typically 45 minutes face-to-face)  [] RE-EVAL     [x] VJ(79459) x  1   [] IONTO  [] NMR (86155) x      [] VASO  [x] Manual (82545) x  1    [] Other:  [] TA x       [] Mech Traction (18398)  [x] ES(attended) (83299)      [] ES (un) (56085):     Goals:   Patient stated goal: relief of pain    Therapist goals for Patient:   Short Term Goals: To be achieved in: 2 weeks  1. Independent in HEP and progression per patient tolerance, in order to prevent re-injury. 2. Patient will have a decrease in pain to facilitate improvement in movement, function, and ADLs as indicated by Functional Deficits. Long Term Goals: To be achieved in: 6 weeks  1. Disability index score of 12% or less for the Oswestry. 2. Patient will demonstrate increased AROM to [facility] [address] without pain. 3. Patient will demonstrate an increase in Strength to good proximal hip and core activation to allow for proper functional mobility as indicated by patients Functional Deficits. 4. Patient will return to work activities without increased symptoms or restriction. MET  5. Pt will sit >45 minutes without pain.      New or Updated Goals (if applicable):  [x] No change to goals established upon initial eval/last progress note:  New Goals:    Progression Towards Functional goals:   [x] Patient is progressing as expected towards functional goals listed. [] Progression is slowed due to complexities listed. [] Progression has been slowed due to co-morbidities. [] Plan just implemented, too soon to assess goals progression  [] Other:     ASSESSMENT:    [] Improvement noted relative to goals:  [] No Improvement noted related to goals:  Summary/Patient's response to treatment: Short on time as pt was stuck in traffic. Tolerated TDN well.   [x] Patient tolerated treatment well [] Patient limited by fatique  [] Patient limited by pain  [] Patient limited by other medical complications  [] Other:     Prognosis: [x] Good [x] Fair  [] Poor    Patient Requires Follow-up: [x] Yes  [] No    PLAN:   [x] Continue per plan of care [x] Alter current plan (see comments): 1x every other week for 6 weeks [] Plan of care initiated [] Hold pending MD visit [] Discharge    Electronically signed by: Destiny Mercado

## 2017-12-04 LAB
CREATININE URINE: 53.1 MG/DL (ref 28–259)
MICROALBUMIN UR-MCNC: <1.2 MG/DL
MICROALBUMIN/CREAT UR-RTO: NORMAL MG/G (ref 0–30)

## 2017-12-07 RX ORDER — PRAVASTATIN SODIUM 40 MG
TABLET ORAL
Qty: 90 TABLET | Refills: 3 | Status: SHIPPED | OUTPATIENT
Start: 2017-12-07 | End: 2019-08-13 | Stop reason: SDUPTHER

## 2017-12-07 RX ORDER — LIRAGLUTIDE 6 MG/ML
INJECTION SUBCUTANEOUS
Qty: 27 ML | Refills: 3 | Status: SHIPPED | OUTPATIENT
Start: 2017-12-07 | End: 2018-06-18

## 2017-12-29 ENCOUNTER — HOSPITAL ENCOUNTER (OUTPATIENT)
Dept: PHYSICAL THERAPY | Age: 42
Discharge: HOME | End: 2017-12-29
Admitting: PHYSICAL MEDICINE & REHABILITATION

## 2017-12-29 NOTE — FLOWSHEET NOTE
[facility] [facility], [address]  Phone: [phone]  Fax [phone]    Physical Therapy Re-Certification Plan of Care/MD UPDATE        Physical Therapy Daily Treatment Note  Date:  2017    Patient Name:  Mary Daniel    :  1975  MRN: [id number]  Restrictions/Precautions:    Physician Information:  Referring Practitioner: Dr. Antonette Reyna  Medical/Treatment Diagnosis Information:  · Diagnosis: M51.26 (ICD-10-CM) - Displacement of lumbar intervertebral disc without myelopathy  · Treatment Diagnosis: Lumbar Pain (M54.5)   [] Conservative / [] Surgical - DOS:  Therapy Diagnosis/Practice Pattern:  Practice Pattern F: Spinal Disorders  Insurance/Certification information:  PT Insurance Information:  MED MUTAL - 750D-90/10-$0CP-PT/OT MED NEC  Plan of care signed: [] YES  [] NO  Number of Comorbidities:  []0     [x]1-2    []3+  Date of Patient follow up with Physician:     G-Code (if applicable):      Date G-Code Applied:         Progress Note: [x]  Yes  []  No  Next due by: Visit #10        Latex Allergy:  [x]NO      []YES  Preferred Language for Healthcare:   [x]English       []other:    Visit # Insurance Allowable Reporting Period   15 MED NEC Begin Date: 2017               End Date:      RECERT DUE BY: 95    Pain level:  2/10     SUBJECTIVE: I cancelled last time because I was doing well. I am doing well overall.   OBJECTIVE:          Test used Initial score Current Score   Pain Summary VAS 6/10 2-3/10   Functional questionnaire [place] 26% 17%/ Oswestry 18%   ROM Lumbar Flexion -10% 80 pain    Lumbar Ext WNL 30    Lumbar SB L/R WNL/-25% WNL B, but pain at end range L          ER L/R  65/75    IRL/R  55/45   Strength ABD L/R 4+/4 4/4+    ERL/R NT 4+4+    extL/R NT 4/4     RESTRICTIONS/PRECAUTIONS:     Exercises/Interventions:      Muscle  Needle Size Technique Notes IES   Site 1 [x] Pistoning / [] modulating pain, promoting relaxation,  increasing ROM, reducing/eliminating soft tissue swelling/inflammation/restriction, improving soft tissue extensibility and allowing for proper ROM for normal function with self care, mobility, lifting and ambulation. Modalities:      Charges:  Timed Code Treatment Minutes: 55   Total Treatment Minutes: 55     [] EVAL (LOW) 21011 (typically 20 minutes face-to-face)  [] EVAL (MOD) 76341 (typically 30 minutes face-to-face)  [] EVAL (HIGH) 86401 (typically 45 minutes face-to-face)  [] RE-EVAL     [x] TN(48008) x  2   [] IONTO  [] NMR (92519) x      [] VASO  [x] Manual (59700) x  1    [] Other:  [] TA x       [] Mech Traction (97968)  [x] ES(attended) (29961)      [] ES (un) (21594):     Goals:   Patient stated goal: relief of pain    Therapist goals for Patient:   Short Term Goals: To be achieved in: 2 weeks  1. Independent in HEP and progression per patient tolerance, in order to prevent re-injury. 2. Patient will have a decrease in pain to facilitate improvement in movement, function, and ADLs as indicated by Functional Deficits. Long Term Goals: To be achieved in: 6 weeks  1. Disability index score of 12% or less for the Oswestry. 2. Patient will demonstrate increased AROM to [facility] without pain. 3. Patient will demonstrate an increase in Strength to good proximal hip and core activation to allow for proper functional mobility as indicated by patients Functional Deficits. 4. Patient will return to work activities without increased symptoms or restriction. MET  5. Pt will sit >45 minutes without pain. New or Updated Goals (if applicable):  [x] No change to goals established upon initial eval/last progress note:  New Goals:    Progression Towards Functional goals:   [x] Patient is progressing as expected towards functional goals listed. [] Progression is slowed due to complexities listed. [] Progression has been slowed due to co-morbidities.   [] Plan just implemented, too soon to assess goals progression  [] Other:     ASSESSMENT:    [] Improvement noted relative to goals:  [] No Improvement noted related to goals:  Summary/Patient's response to treatment: d/c. Reviewed HEP.     [x] Patient tolerated treatment well [] Patient limited by fatique  [] Patient limited by pain  [] Patient limited by other medical complications  [] Other:     Prognosis: [x] Good [x] Fair  [] Poor    Patient Requires Follow-up: [x] Yes  [] No    PLAN:   [] Continue per plan of care [] Alter current plan (see comments): 1x every other week for 6 weeks [] Plan of care initiated [] Hold pending MD visit [x] Discharge    Electronically signed by: Destiny Holguin

## 2017-12-29 NOTE — DISCHARGE SUMMARY
[facility], [address] [place]  Phone: [phone]  Fax [phone]       Physical Therapy Discharge  Date: 2017        Patient Name:  Guzman Chow    :  1975  MRN: [id number]  Physician Information:  Referring Practitioner: Dr. Carmelita Blanco  Medical/Treatment Diagnosis Information:  · Diagnosis: M51.26 (ICD-10-CM) - Displacement of lumbar intervertebral disc without myelopathy  · Treatment Diagnosis: Lumbar Pain (M54.5)   [x] Conservative / [] Surgical - DOS:  Therapy Diagnosis/Practice Pattern: F    Insurance/Certification information:  PT Insurance Information:  MED MUTAL - 750D-90/10-$0CP-PT/OT MED NEC  Plan of care signed: [] YES  [] NO  Number of Comorbidities:  []0     [x]1-2    []3+       Visit # Insurance Allowable Reporting Period   15 MED NEC Begin Date: 2017               End Date: 17          Test used Initial score Current Score   Pain Summary VAS 6/10 2-3/10   Functional questionnaire [place] 26% 8%   ROM Lumbar Flexion -10% 67 pain     Lumbar Ext WNL 30     Lumbar SB L/R WNL/-25% WNL B, but pain at end range L               ER L/R   65/75     IRL/R   55/45   Strength ABD L/R 4+/4 4/4+     ERL/R NT 4+4+     extL/R NT 4/4      RESTRICTIONS/PRECAUTIONS:     Functional Limitation G-Code (if applicable):         PT G-Codes  Functional Assessment Tool Used: Oswestry  Score: 8%  Functional Limitation: Changing and maintaining body position  Changing and Maintaining Body Position Current Status (): At least 1 percent but less than 20 percent impaired, limited or restricted  Changing and Maintaining Body Position Goal Status (): At least 1 percent but less than 20 percent impaired, limited or restricted  Changing and Maintaining Body Position Discharge Status ():  At least 1 percent but less than 20 percent impaired, limited or restricted   Test/tests used to determine %

## 2018-01-01 ENCOUNTER — HOSPITAL ENCOUNTER (OUTPATIENT)
Dept: PHYSICAL THERAPY | Age: 43
Discharge: HOME | End: 2018-01-31
Attending: PHYSICAL MEDICINE & REHABILITATION | Admitting: PHYSICAL MEDICINE & REHABILITATION

## 2018-01-10 ENCOUNTER — NURSE TRIAGE (OUTPATIENT)
Dept: OTHER | Facility: CLINIC | Age: 43
End: 2018-01-10

## 2018-01-18 ENCOUNTER — HOSPITAL ENCOUNTER (OUTPATIENT)
Dept: MAMMOGRAPHY | Age: 43
Discharge: HOME | End: 2018-01-18
Attending: OBSTETRICS & GYNECOLOGY | Admitting: OBSTETRICS & GYNECOLOGY

## 2018-01-18 DIAGNOSIS — Z12.31 ENCOUNTER FOR SCREENING MAMMOGRAM FOR BREAST CANCER: ICD-10-CM

## 2018-01-18 RX ORDER — PEN NEEDLE, DIABETIC 31 GX3/16"
NEEDLE, DISPOSABLE MISCELLANEOUS
Qty: 100 EACH | Refills: 3 | Status: SHIPPED | OUTPATIENT
Start: 2018-01-18

## 2018-02-05 ENCOUNTER — SCHEDULED TELEPHONE ENCOUNTER (OUTPATIENT)
Dept: PHARMACY | Facility: CLINIC | Age: 43
End: 2018-02-05

## 2018-02-05 RX ORDER — PANTOPRAZOLE SODIUM 20 MG/1
TABLET, DELAYED RELEASE ORAL
Qty: 90 TABLET | Refills: 3 | Status: SHIPPED | OUTPATIENT
Start: 2018-02-05 | End: 2019-01-02 | Stop reason: SDUPTHER

## 2018-02-05 NOTE — TELEPHONE ENCOUNTER
for identified gaps or as scheduled below  - Upcoming appointments:   Future Appointments  Date Time Provider Adrianne Oyselin   2/22/2018 1:00 PM MD ISH Merlos [facility]   2/26/2018 2:40 PM MD Mesfin Restrepo [facility]   11/8/2018 12:00 PM Marcia Quach MD [facility], [address] [place] [address], PharmD  [address] [facility]   Phone: [phone]    For Pharmacy Admin Tracking Only    PHSO: Yes  Total # of Interventions Recommended: 0  Total Interventions Accepted: 0  Time Spent (min): 15

## 2018-02-22 ENCOUNTER — OFFICE VISIT (OUTPATIENT)
Dept: FAMILY MEDICINE CLINIC | Age: 43
End: 2018-02-22

## 2018-02-22 VITALS
DIASTOLIC BLOOD PRESSURE: 72 MMHG | HEIGHT: 61 IN | OXYGEN SATURATION: 98 % | HEART RATE: 102 BPM | SYSTOLIC BLOOD PRESSURE: 124 MMHG | BODY MASS INDEX: 38.89 KG/M2 | WEIGHT: 206 LBS

## 2018-02-22 DIAGNOSIS — R41.840 ATTENTION AND CONCENTRATION DEFICIT: ICD-10-CM

## 2018-02-22 DIAGNOSIS — Z00.00 ROUTINE GENERAL MEDICAL EXAMINATION AT A HEALTH CARE FACILITY: Primary | ICD-10-CM

## 2018-02-22 PROCEDURE — 99396 PREV VISIT EST AGE 40-64: CPT | Performed by: FAMILY MEDICINE

## 2018-02-22 ASSESSMENT — PATIENT HEALTH QUESTIONNAIRE - PHQ9
SUM OF ALL RESPONSES TO PHQ9 QUESTIONS 1 & 2: 0
1. LITTLE INTEREST OR PLEASURE IN DOING THINGS: 0
2. FEELING DOWN, DEPRESSED OR HOPELESS: 0
SUM OF ALL RESPONSES TO PHQ QUESTIONS 1-9: 0

## 2018-02-22 NOTE — PROGRESS NOTES
TABS tablet Take 500 mg by mouth daily.  cetirizine (ZYRTEC) 10 MG tablet Take 10 mg by mouth daily. No current facility-administered medications for this visit.         Past Medical History:   Diagnosis Date    Allergic rhinitis     Anxiety 1997    Asthma 1978    persisitent    GERD (gastroesophageal reflux disease) 1985    Headache(784.0) 1993    Hyperlipidemia 3/11/2014    Obesity     Seizures ([address] 2007    one seizure    Type II or unspecified type diabetes mellitus without mention of complication, not stated as uncontrolled 2012     Past Surgical History:   Procedure Laterality Date    WISDOM TOOTH EXTRACTION      2 removed     Family History   Problem Relation Age of Onset    Arthritis Mother     Arthritis Father      OA    Diabetes Father     High Blood Pressure Father     High Cholesterol Father    Maricruz Forbes Vision Loss Father     Heart Disease Father 61     MI. quad bypass    Allergies Sister      allergy induced asthma    Arthritis Maternal Grandmother      OA    Cancer Maternal Grandmother 80     colon    Heart Disease Maternal Grandmother     High Blood Pressure Maternal Grandmother     Kidney Disease Maternal Grandmother     Arthritis Maternal Grandfather     Cancer Maternal Grandfather      esophageal    Arthritis Paternal Grandmother     Diabetes Paternal Grandmother     Arthritis Paternal Grandfather     Cancer Paternal Grandfather 79     colon    Cancer Maternal Aunt 55     breast     Social History     Social History    Marital status:      Spouse name: N/A    Number of children: N/A    Years of education: N/A     Occupational History    [employer]      Social History Main Topics    Smoking status: Current Every Day Smoker     Packs/day: 0.75     Years: 18.00     Last attempt to quit: 1/10/2013    Smokeless tobacco: Never Used      Comment: 1/13/2013 quit    Alcohol use Yes      Comment: 1 drink a month    Drug use: No      Comment: 1/13/13    Sexual activity: Yes     Other Topics Concern    Not on file     Social History Narrative    No narrative on file       Review of Systems:  A comprehensive review of systems was negative except for what was noted in the HPI. Physical Exam:   Vitals:    02/22/18 1258   BP: 124/72   Site: Right Arm   Position: Sitting   Cuff Size: Large Adult   Pulse: 102   SpO2: 98%   Weight: 206 lb (93.4 kg)   Height: 5' 0.63\" (1.54 m)     Body mass index is 39.4 kg/m². Constitutional: She is alert. She appears well-developed and well-nourished. No distress. HEENT:   Head: Normocephalic and atraumatic. Right Ear: Tympanic membrane, external ear and ear canal normal.   Left Ear: Tympanic membrane, external ear and ear canal normal.   Nose: Nose normal.   Mouth/Throat: Oropharynx is clear and moist, and mucous membranes are normal.  There is no cervical adenopathy. Eyes: Conjunctivae and extraocular motions are normal. Pupils are equal, round, and reactive to light. Neck: Neck supple. No JVD present. Carotid bruit is not present. No mass and no thyromegaly present. Cardiovascular: Normal rate, regular rhythm, normal heart sounds and intact distal pulses. Exam reveals no gallop and no friction rub. No murmur heard. Pulmonary/Chest: Effort normal and breath sounds normal. No respiratory distress. She has no wheezes, rhonchi or rales. Abdominal: Soft, non-tender. Bowel sounds and aorta are normal. She exhibits no organomegaly, mass or bruit. Musculoskeletal: Normal range of motion, no synovitis. She exhibits no edema. Neurological: She is alert and oriented to person, place, and time. She has normal reflexes. No cranial nerve deficit. Coordination normal.   Skin: Skin is warm and dry. There is no rash or erythema. No suspicious lesions noted. Psychiatric: She has a normal mood and affect.  Her speech is normal and behavior is normal. Judgment, cognition and memory are normal.       Lab Review:   Orders Only on

## 2018-02-24 ENCOUNTER — HOSPITAL ENCOUNTER (OUTPATIENT)
Dept: OTHER | Age: 43
Discharge: HOME | End: 2018-02-24
Attending: INTERNAL MEDICINE | Admitting: INTERNAL MEDICINE

## 2018-02-24 DIAGNOSIS — E11.9 DIABETES MELLITUS WITHOUT COMPLICATION (HCC): ICD-10-CM

## 2018-02-24 DIAGNOSIS — E78.5 HYPERLIPIDEMIA, UNSPECIFIED HYPERLIPIDEMIA TYPE: ICD-10-CM

## 2018-02-24 LAB
A/G RATIO: 1.3 (ref 1.1–2.2)
ALBUMIN SERPL-MCNC: 4.3 G/DL (ref 3.4–5)
ALP BLD-CCNC: 67 U/L (ref 40–129)
ALT SERPL-CCNC: 18 U/L (ref 10–40)
ANION GAP SERPL CALCULATED.3IONS-SCNC: 14 MMOL/L (ref 3–16)
AST SERPL-CCNC: 21 U/L (ref 15–37)
BILIRUB SERPL-MCNC: 0.5 MG/DL (ref 0–1)
BUN BLDV-MCNC: 15 MG/DL (ref 7–20)
CALCIUM SERPL-MCNC: 9.3 MG/DL (ref 8.3–10.6)
CHLORIDE BLD-SCNC: 101 MMOL/L (ref 99–110)
CHOLESTEROL, TOTAL: 193 MG/DL (ref 0–199)
CO2: 25 MMOL/L (ref 21–32)
CREAT SERPL-MCNC: 0.6 MG/DL (ref 0.6–1.1)
GFR AFRICAN AMERICAN: >60
GFR NON-AFRICAN AMERICAN: >60
GLOBULIN: 3.3 G/DL
GLUCOSE BLD-MCNC: 120 MG/DL (ref 70–99)
HDLC SERPL-MCNC: 60 MG/DL (ref 40–60)
LDL CHOLESTEROL CALCULATED: 94 MG/DL
POTASSIUM SERPL-SCNC: 4.3 MMOL/L (ref 3.5–5.1)
SODIUM BLD-SCNC: 140 MMOL/L (ref 136–145)
TOTAL PROTEIN: 7.6 G/DL (ref 6.4–8.2)
TRIGL SERPL-MCNC: 195 MG/DL (ref 0–150)
TSH SERPL DL<=0.05 MIU/L-ACNC: 1.53 UIU/ML (ref 0.27–4.2)
VLDLC SERPL CALC-MCNC: 39 MG/DL

## 2018-02-25 LAB
ESTIMATED AVERAGE GLUCOSE: 177.2 MG/DL
HBA1C MFR BLD: 7.8 %

## 2018-02-26 ENCOUNTER — OFFICE VISIT (OUTPATIENT)
Dept: ENDOCRINOLOGY | Age: 43
End: 2018-02-26

## 2018-02-26 VITALS
BODY MASS INDEX: 38.68 KG/M2 | HEIGHT: 62 IN | DIASTOLIC BLOOD PRESSURE: 86 MMHG | WEIGHT: 210.2 LBS | SYSTOLIC BLOOD PRESSURE: 128 MMHG | OXYGEN SATURATION: 96 % | HEART RATE: 98 BPM | RESPIRATION RATE: 18 BRPM

## 2018-02-26 DIAGNOSIS — E11.9 TYPE 2 DIABETES MELLITUS WITHOUT COMPLICATION, WITHOUT LONG-TERM CURRENT USE OF INSULIN (HCC): Primary | ICD-10-CM

## 2018-02-26 DIAGNOSIS — E78.5 HYPERLIPIDEMIA, UNSPECIFIED HYPERLIPIDEMIA TYPE: ICD-10-CM

## 2018-02-26 PROCEDURE — 99214 OFFICE O/P EST MOD 30 MIN: CPT | Performed by: INTERNAL MEDICINE

## 2018-02-26 NOTE — PROGRESS NOTES
(VITAMIN D3) 2000 UNITS CAPS Take by mouth      Multiple Vitamins-Minerals (THERAPEUTIC MULTIVITAMIN-MINERALS) tablet Take 1 tablet by mouth daily      B Complex-Folic Acid (B COMPLEX-VITAMIN B12) TABS Take  by mouth.  0    calcium carbonate (OSCAL) 500 MG TABS tablet Take 500 mg by mouth daily.  cetirizine (ZYRTEC) 10 MG tablet Take 10 mg by mouth daily. Vitals:    02/26/18 1442   BP: 128/86   Site: Right Arm   Position: Sitting   Cuff Size: Large Adult   Pulse: 98   Resp: 18   SpO2: 96%   Weight: 210 lb 3.2 oz (95.3 kg)   Height: 5' 2\" (1.575 m)     Body mass index is 38.45 kg/m².      Wt Readings from Last 3 Encounters:   02/26/18 210 lb 3.2 oz (95.3 kg)   02/22/18 206 lb (93.4 kg)   09/21/17 199 lb 15.3 oz (90.7 kg)     BP Readings from Last 3 Encounters:   02/26/18 128/86   02/22/18 124/72   09/21/17 129/88        Past Medical History:   Diagnosis Date    Allergic rhinitis     Anxiety 1997    Asthma 1978    persisitent    GERD (gastroesophageal reflux disease) 1985    Headache(784.0) 1993    Hyperlipidemia 3/11/2014    Obesity     Seizures ([facility] 2007    one seizure    Type II or unspecified type diabetes mellitus without mention of complication, not stated as uncontrolled 2012     Past Surgical History:   Procedure Laterality Date    WISDOM TOOTH EXTRACTION      2 removed     Family History   Problem Relation Age of Onset    Arthritis Mother     Arthritis Father      OA    Diabetes Father     High Blood Pressure Father     High Cholesterol Father    Camryn Land Vision Loss Father     Heart Disease Father 61     MI. quad bypass    Allergies Sister      allergy induced asthma    Arthritis Maternal Grandmother      OA    Cancer Maternal Grandmother 80     colon    Heart Disease Maternal Grandmother     High Blood Pressure Maternal Grandmother     Kidney Disease Maternal Grandmother     Arthritis Maternal Grandfather     Cancer Maternal Grandfather      esophageal    Arthritis

## 2018-03-13 ENCOUNTER — OFFICE VISIT (OUTPATIENT)
Dept: URGENT CARE | Age: 43
End: 2018-03-13

## 2018-03-13 VITALS
TEMPERATURE: 98.5 F | SYSTOLIC BLOOD PRESSURE: 125 MMHG | BODY MASS INDEX: 38.09 KG/M2 | HEART RATE: 99 BPM | WEIGHT: 207 LBS | DIASTOLIC BLOOD PRESSURE: 74 MMHG | HEIGHT: 62 IN | RESPIRATION RATE: 16 BRPM | OXYGEN SATURATION: 94 %

## 2018-03-13 DIAGNOSIS — J01.90 ACUTE NON-RECURRENT SINUSITIS, UNSPECIFIED LOCATION: Primary | ICD-10-CM

## 2018-03-13 DIAGNOSIS — R51.9 NONINTRACTABLE HEADACHE, UNSPECIFIED CHRONICITY PATTERN, UNSPECIFIED HEADACHE TYPE: ICD-10-CM

## 2018-03-13 PROCEDURE — 99212 OFFICE O/P EST SF 10 MIN: CPT | Performed by: PHYSICIAN ASSISTANT

## 2018-03-13 RX ORDER — BUTALBITAL, ACETAMINOPHEN AND CAFFEINE 300; 40; 50 MG/1; MG/1; MG/1
1 CAPSULE ORAL EVERY 4 HOURS PRN
Qty: 10 CAPSULE | Refills: 0 | Status: SHIPPED | OUTPATIENT
Start: 2018-03-13 | End: 2018-06-25 | Stop reason: ALTCHOICE

## 2018-03-13 RX ORDER — AZITHROMYCIN 250 MG/1
TABLET, FILM COATED ORAL
Qty: 1 PACKET | Refills: 0 | Status: SHIPPED | OUTPATIENT
Start: 2018-03-13 | End: 2018-03-23

## 2018-03-13 RX ORDER — METHYLPREDNISOLONE 4 MG/1
TABLET ORAL
Qty: 1 KIT | Refills: 0 | Status: SHIPPED | OUTPATIENT
Start: 2018-03-13 | End: 2018-06-25 | Stop reason: ALTCHOICE

## 2018-03-13 ASSESSMENT — ENCOUNTER SYMPTOMS
ABDOMINAL PAIN: 0
STRIDOR: 0
SPUTUM PRODUCTION: 0
VOMITING: 0
SORE THROAT: 0
DIARRHEA: 0
COUGH: 0
WHEEZING: 0
SINUS PAIN: 1
NAUSEA: 0

## 2018-03-13 NOTE — PROGRESS NOTES
Reason for Visit:   Chief Complaint   Patient presents with    Sinus Problem     sinus pressure, vertigo     Patient History     HPI: Terri Mcbride is a 43 y.o. female who presents with sinus pressure, intermittent vertigo, nasal congestion, and headaches. She states nasal congestion and sinus pressure started a week or so ago, but she has been having headaches on a near daily basis for over one month. She has been taking Ibuprofen and Excedrin for sxs but reports only mild relief. Headaches or general ache with no distinguished factors. She also has hx of environmental allergies and is going to be doing RAST testing for environmental triggers as well as food panel and has recently stopped her antihistamine for this. She denies any fever, sore throat, chest congestion, wheeze, cough, changes in vision, N/V, or abdominal pain. Past Medical History:   Diagnosis Date    Allergic rhinitis     Anxiety 1997    Asthma 1978    persisitent    GERD (gastroesophageal reflux disease) 1985    Headache(784.0) 1993    Hyperlipidemia 3/11/2014    Obesity     Seizures ([facility] [address] 2007    one seizure    Type II or unspecified type diabetes mellitus without mention of complication, not stated as uncontrolled 2012       Past Surgical History:   Procedure Laterality Date    WISDOM TOOTH EXTRACTION      2 removed       Allergies: Allergies   Allergen Reactions    Fish-Derived Products Anaphylaxis    Amoxicillin Hives    Pcn [Penicillins] Hives       Review of Systems     ROS: Please refer to HPI for any pertinent positive findings, as all other systems were reviewed as negative unless otherwise listed above. Review of Systems   Constitutional: Negative for chills, diaphoresis, fever and malaise/fatigue. HENT: Positive for congestion and sinus pain. Negative for ear pain, sore throat and tinnitus.     Respiratory: Negative for cough, sputum production, wheezing and

## 2018-03-13 NOTE — PATIENT INSTRUCTIONS
began, how long it lasted, and what the pain was like (throbbing, aching, stabbing, or dull). Write down any other symptoms you had with the headache, such as nausea, flashing lights or dark spots, or sensitivity to bright light or loud noise. Note if the headache occurred near your period. List anything that might have triggered the headache, such as certain foods (chocolate, cheese, wine) or odors, smoke, bright light, stress, or lack of sleep. · Find healthy ways to deal with stress. Headaches are most common during or right after stressful times. Take time to relax before and after you do something that has caused a headache in the past.  · Try to keep your muscles relaxed by keeping good posture. Check your jaw, face, neck, and shoulder muscles for tension, and try relaxing them. When sitting at a desk, change positions often, and stretch for 30 seconds each hour. · Get plenty of sleep and exercise. · Eat regularly and well. Long periods without food can trigger a headache. · Treat yourself to a massage. Some people find that regular massages are very helpful in relieving tension. · Limit caffeine by not drinking too much coffee, tea, or soda. But don't quit caffeine suddenly, because that can also give you headaches. · Reduce eyestrain from computers by blinking frequently and looking away from the computer screen every so often. Make sure you have proper eyewear and that your monitor is set up properly, about an arm's length away. · Seek help if you have depression or anxiety. Your headaches may be linked to these conditions. Treatment can both prevent headaches and help with symptoms of anxiety or depression. When should you call for help? Call 911 anytime you think you may need emergency care. For example, call if:  ? · You have signs of a stroke. These may include:  ¨ Sudden numbness, paralysis, or weakness in your face, arm, or leg, especially on only one side of your body.   ¨ Sudden vision

## 2018-03-26 RX ORDER — LIRAGLUTIDE 6 MG/ML
INJECTION SUBCUTANEOUS
Qty: 27 ML | Refills: 3 | Status: SHIPPED | OUTPATIENT
Start: 2018-03-26 | End: 2018-06-18

## 2018-05-11 RX ORDER — PEN NEEDLE, DIABETIC 31 GX3/16"
NEEDLE, DISPOSABLE MISCELLANEOUS
Qty: 100 EACH | Refills: 3 | Status: SHIPPED | OUTPATIENT
Start: 2018-05-11 | End: 2018-06-25 | Stop reason: SDUPTHER

## 2018-05-11 RX ORDER — PANTOPRAZOLE SODIUM 20 MG/1
TABLET, DELAYED RELEASE ORAL
Qty: 90 TABLET | Refills: 2 | Status: SHIPPED | OUTPATIENT
Start: 2018-05-11 | End: 2018-06-25 | Stop reason: SDUPTHER

## 2018-06-18 RX ORDER — BUDESONIDE AND FORMOTEROL FUMARATE DIHYDRATE 160; 4.5 UG/1; UG/1
AEROSOL RESPIRATORY (INHALATION)
Qty: 30.6 G | Refills: 3 | Status: SHIPPED | OUTPATIENT
Start: 2018-06-18 | End: 2018-09-24 | Stop reason: SDUPTHER

## 2018-06-25 ENCOUNTER — OFFICE VISIT (OUTPATIENT)
Dept: ENDOCRINOLOGY | Age: 43
End: 2018-06-25

## 2018-06-25 VITALS
OXYGEN SATURATION: 95 % | DIASTOLIC BLOOD PRESSURE: 83 MMHG | WEIGHT: 203 LBS | BODY MASS INDEX: 37.36 KG/M2 | SYSTOLIC BLOOD PRESSURE: 122 MMHG | RESPIRATION RATE: 20 BRPM | HEIGHT: 62 IN | HEART RATE: 104 BPM

## 2018-06-25 DIAGNOSIS — E78.5 HYPERLIPIDEMIA, UNSPECIFIED HYPERLIPIDEMIA TYPE: ICD-10-CM

## 2018-06-25 DIAGNOSIS — E11.9 DIABETES MELLITUS WITHOUT COMPLICATION (HCC): Primary | ICD-10-CM

## 2018-06-25 LAB — HBA1C MFR BLD: 7.4 %

## 2018-06-25 PROCEDURE — 99214 OFFICE O/P EST MOD 30 MIN: CPT | Performed by: INTERNAL MEDICINE

## 2018-06-25 PROCEDURE — 83036 HEMOGLOBIN GLYCOSYLATED A1C: CPT | Performed by: INTERNAL MEDICINE

## 2018-08-17 ENCOUNTER — OFFICE VISIT (OUTPATIENT)
Dept: FAMILY MEDICINE CLINIC | Age: 43
End: 2018-08-17

## 2018-08-17 ENCOUNTER — TELEPHONE (OUTPATIENT)
Dept: PHARMACY | Facility: CLINIC | Age: 43
End: 2018-08-17

## 2018-08-17 VITALS
HEIGHT: 62 IN | DIASTOLIC BLOOD PRESSURE: 76 MMHG | WEIGHT: 203 LBS | SYSTOLIC BLOOD PRESSURE: 120 MMHG | HEART RATE: 106 BPM | BODY MASS INDEX: 37.36 KG/M2 | OXYGEN SATURATION: 96 %

## 2018-08-17 DIAGNOSIS — F41.9 ANXIETY: Primary | ICD-10-CM

## 2018-08-17 DIAGNOSIS — Z13.31 POSITIVE DEPRESSION SCREENING: ICD-10-CM

## 2018-08-17 DIAGNOSIS — F43.23 ADJUSTMENT DISORDER WITH MIXED ANXIETY AND DEPRESSED MOOD: ICD-10-CM

## 2018-08-17 PROCEDURE — G8431 POS CLIN DEPRES SCRN F/U DOC: HCPCS | Performed by: FAMILY MEDICINE

## 2018-08-17 PROCEDURE — 99213 OFFICE O/P EST LOW 20 MIN: CPT | Performed by: FAMILY MEDICINE

## 2018-08-17 RX ORDER — ARIPIPRAZOLE 2 MG/1
2 TABLET ORAL DAILY
Qty: 30 TABLET | Refills: 0 | Status: SHIPPED | OUTPATIENT
Start: 2018-08-17 | End: 2019-01-18 | Stop reason: ALTCHOICE

## 2018-08-17 RX ORDER — ARIPIPRAZOLE 2 MG/1
2 TABLET ORAL DAILY
Qty: 30 TABLET | Refills: 1 | Status: SHIPPED | OUTPATIENT
Start: 2018-08-17 | End: 2018-08-17 | Stop reason: SDUPTHER

## 2018-08-17 ASSESSMENT — PATIENT HEALTH QUESTIONNAIRE - PHQ9
SUM OF ALL RESPONSES TO PHQ QUESTIONS 1-9: 14
5. POOR APPETITE OR OVEREATING: 0
10. IF YOU CHECKED OFF ANY PROBLEMS, HOW DIFFICULT HAVE THESE PROBLEMS MADE IT FOR YOU TO DO YOUR WORK, TAKE CARE OF THINGS AT HOME, OR GET ALONG WITH OTHER PEOPLE: 1
SUM OF ALL RESPONSES TO PHQ9 QUESTIONS 1 & 2: 4
9. THOUGHTS THAT YOU WOULD BE BETTER OFF DEAD, OR OF HURTING YOURSELF: 1
8. MOVING OR SPEAKING SO SLOWLY THAT OTHER PEOPLE COULD HAVE NOTICED. OR THE OPPOSITE, BEING SO FIGETY OR RESTLESS THAT YOU HAVE BEEN MOVING AROUND A LOT MORE THAN USUAL: 0
7. TROUBLE CONCENTRATING ON THINGS, SUCH AS READING THE NEWSPAPER OR WATCHING TELEVISION: 3
6. FEELING BAD ABOUT YOURSELF - OR THAT YOU ARE A FAILURE OR HAVE LET YOURSELF OR YOUR FAMILY DOWN: 2
SUM OF ALL RESPONSES TO PHQ QUESTIONS 1-9: 14
2. FEELING DOWN, DEPRESSED OR HOPELESS: 2
1. LITTLE INTEREST OR PLEASURE IN DOING THINGS: 2
4. FEELING TIRED OR HAVING LITTLE ENERGY: 3
3. TROUBLE FALLING OR STAYING ASLEEP: 1

## 2018-08-17 NOTE — PATIENT INSTRUCTIONS
depressed, or hopeless or have lost interest in things that you usually enjoy.     · You do not get better as expected. Where can you learn more? Go to https://e-Go [email or web address].[employer]. org and sign in to your [employer] account. Enter [id number] in the Fast Asset box to learn more about \"Adjustment Disorder: Care Instructions. \"     If you do not have an account, please click on the \"Sign Up Now\" link. Current as of: October 10, 2017  Content Version: 11.7  © 2090-8638 [employer]. Care instructions adapted under license by [facility] [address] If you have questions about a medical condition or this instruction, always ask your healthcare professional. [address] any warranty or liability for your use of this information.

## 2018-08-20 NOTE — TELEPHONE ENCOUNTER
Received confirmation from [facility] that points have been loaded. Sent patient a [employer] message with instructions to find her points. Will sign off at this time.     Leeroy Fontanez, PharmD  [address] [place] [address] Pharmacist  [phone] or [phone] (Option 7)

## 2018-08-24 ENCOUNTER — NURSE TRIAGE (OUTPATIENT)
Dept: OTHER | Facility: CLINIC | Age: 43
End: 2018-08-24

## 2018-08-24 ENCOUNTER — HOSPITAL ENCOUNTER (EMERGENCY)
Age: 43
Discharge: HOME | End: 2018-08-24
Payer: COMMERCIAL

## 2018-08-24 VITALS
SYSTOLIC BLOOD PRESSURE: 147 MMHG | HEIGHT: 62 IN | WEIGHT: 203 LBS | BODY MASS INDEX: 37.36 KG/M2 | HEART RATE: 89 BPM | OXYGEN SATURATION: 96 % | TEMPERATURE: 98.6 F | RESPIRATION RATE: 16 BRPM | DIASTOLIC BLOOD PRESSURE: 85 MMHG

## 2018-08-24 DIAGNOSIS — K08.89 PAIN, DENTAL: Primary | ICD-10-CM

## 2018-08-24 PROCEDURE — 4500000022 HC ED LEVEL 2 PROCEDURE

## 2018-08-24 PROCEDURE — 99282 EMERGENCY DEPT VISIT SF MDM: CPT

## 2018-08-24 ASSESSMENT — PAIN SCALES - GENERAL
PAINLEVEL_OUTOF10: 4
PAINLEVEL_OUTOF10: 10

## 2018-08-27 NOTE — ED PROVIDER NOTES
CHIEF COMPLAINT  Dental Pain (right sided dental pain, saw dentist placed on narcotics, abx, steriods yesterday; states is worse)      HISTORY OF PRESENT ILLNESS  Gio Jefferson is a 43 y.o. female who presents to the ED for evaluation of dental pain. Patient states she has had dental pain for the last couple days. She saw her dentist both yesterday and today. Patient was placed on narcotic pain medication, antibiotics, and steroids. Patient states that she has having pain to the point where she cant sleep. She is here for further evaluation. No fever. No other complaints, modifying factors or associated symptoms. Nursing notes reviewed.    Past Medical History:   Diagnosis Date    Allergic rhinitis     Anxiety 1997    Asthma 1978    persisitent    GERD (gastroesophageal reflux disease) 1985    Headache(784.0) 1993    Hyperlipidemia 3/11/2014    Obesity     Seizures ([facility] [address] [facility] 2007    one seizure    Type II or unspecified type diabetes mellitus without mention of complication, not stated as uncontrolled 2012     Past Surgical History:   Procedure Laterality Date    WISDOM TOOTH EXTRACTION      2 removed     Family History   Problem Relation Age of Onset    Arthritis Mother     Arthritis Father         OA    Diabetes Father     High Blood Pressure Father     High Cholesterol Father     Vision Loss Father     Heart Disease Father 61        MI. quad bypass    Allergies Sister         allergy induced asthma    Arthritis Maternal Grandmother         OA    Cancer Maternal Grandmother 80        colon    Heart Disease Maternal Grandmother     High Blood Pressure Maternal Grandmother     Kidney Disease Maternal Grandmother     Arthritis Maternal Grandfather     Cancer Maternal Grandfather         esophageal    Arthritis Paternal Grandmother     Diabetes Paternal Grandmother     Arthritis Paternal Grandfather     Cancer Paternal Grandfather 79        colon    Cancer Maternal Aunt 55 directed for allergic reaction 2 each 0    albuterol sulfate HFA (VENTOLIN HFA) 108 (90 Base) MCG/ACT inhaler Inhale 2 puffs into the lungs every 6 hours as needed for Wheezing 3 Inhaler 3    fluticasone (FLONASE) 50 MCG/ACT nasal spray 1 spray by Nasal route daily 3 Bottle 3    Cholecalciferol (VITAMIN D3) 2000 UNITS CAPS Take by mouth      Multiple Vitamins-Minerals (THERAPEUTIC MULTIVITAMIN-MINERALS) tablet Take 1 tablet by mouth daily      B Complex-Folic Acid (B COMPLEX-VITAMIN B12) TABS Take  by mouth.  0    calcium carbonate (OSCAL) 500 MG TABS tablet Take 500 mg by mouth daily.  cetirizine (ZYRTEC) 10 MG tablet Take 10 mg by mouth daily. Allergies   Allergen Reactions    Fish-Derived Products Anaphylaxis    Amoxicillin Hives    Pcn [Penicillins] Hives       REVIEW OF SYSTEMS  6 systems reviewed, pertinent positives per HPI otherwise noted to be negative    PHYSICAL EXAM  BP (!) 147/85   Pulse 89   Temp 98.6 °F (37 °C) (Oral)   Resp 16   Ht 5' 2\" (1.575 m)   Wt 203 lb (92.1 kg)   LMP 08/10/2018   SpO2 96%   BMI 37.13 kg/m²   GENERAL APPEARANCE: Awake and alert. Cooperative. No acute distress. HEAD: Normocephalic. Atraumatic. EYES: PERRL. EOM's grossly intact. ENT: Mucous membranes are moist. Oropharynx is clear. Uvula is midline. TM normal bilaterally. No evidence of a buccal abscess. No trismus. NECK: Supple. Normal ROM. No evidence of ludwigs angina. No adenopathy. No meningismus. CHEST: Equal symmetric chest rise. RRR  LUNGS: Breathing is unlabored. Speaking comfortably in full sentences. CTAB. Abdomen: Nondistended. Nontender. EXTREMITIES: MAEE. No acute deformities. SKIN: Warm and dry. NEUROLOGICAL: Alert and oriented. Strength is 5/5 in all extremities and sensation is intact. Labs:    No results found for this visit on 08/24/18. RADIOLOGY  No results found. ED COURSE/MDM  Patient seen and evaluated here in the ER.  Patient presented to the ER with complaints of dental pain. There is no buccal abscess that I can see to drain. Patient is already on pain medicine as well as abx. Patient was given an inferior alveolar block with good relief. Patient was instructed to follow up with her dentist. patient discharged home in good condition. MDM  Considered sinusitis, otitis media/externa, pharyngitis, peritonsillar abscess, TMJ, trigeminal neuralgia, cardiac ischemia. Pt. with localized dental infection. No evidence of deep fascial space infections (eg Odells angina, retropharyngeal abscess), no facial cellulitis. Tolerating PO and able to maintain hydration. Patient was given scripts for the following medications. I counseled patient how to take these medications. Discharge Medication List as of 8/24/2018 10:07 PM              CLINICAL IMPRESSION  1. Pain, dental        Blood pressure (!) 147/85, pulse 89, temperature 98.6 °F (37 °C), temperature source Oral, resp. rate 16, height 5' 2\" (1.575 m), weight 203 lb (92.1 kg), last menstrual period 08/10/2018, SpO2 96 %, not currently breastfeeding. DISPOSITION  Patient was discharged to home in good condition.        Charlette Harada, MARIANNA - CNP  08/27/18 9103

## 2018-08-30 RX ORDER — PEN NEEDLE, DIABETIC 31 GX3/16"
NEEDLE, DISPOSABLE MISCELLANEOUS
Qty: 100 EACH | Refills: 2 | Status: SHIPPED | OUTPATIENT
Start: 2018-08-30 | End: 2019-01-02 | Stop reason: SDUPTHER

## 2018-09-24 RX ORDER — BUDESONIDE AND FORMOTEROL FUMARATE DIHYDRATE 160; 4.5 UG/1; UG/1
AEROSOL RESPIRATORY (INHALATION)
Qty: 3 INHALER | Refills: 3 | Status: SHIPPED | OUTPATIENT
Start: 2018-09-24 | End: 2019-03-28 | Stop reason: SDUPTHER

## 2018-09-24 RX ORDER — PRAVASTATIN SODIUM 40 MG
TABLET ORAL
Qty: 90 TABLET | Refills: 0 | Status: SHIPPED | OUTPATIENT
Start: 2018-09-24 | End: 2019-01-02 | Stop reason: SDUPTHER

## 2018-09-24 NOTE — TELEPHONE ENCOUNTER
Last rx symbicort 6/18/18 with 3 refills  Last rx ventolin 6/23/17 with 3 refills  Last ov 8/17/18  No future ov

## 2018-11-16 ENCOUNTER — TELEPHONE (OUTPATIENT)
Dept: PHARMACY | Facility: CLINIC | Age: 43
End: 2018-11-16

## 2018-11-16 NOTE — LETTER
Jasbrayden Fina   [address] [place] [address]           11/16/18     Dear Torito Singleton,    Thanks so much for taking the first step towards better health. This letter is to inform you that we have received your enrollment form for the [facility] DM Program but you are missing the following requirements or documentation:    Urine albumin test yearly    To continue to qualify for this program the above requirements must be met by 12/31/2018. Results or visits obtained outside of [facility] will need to be provided by fax or email to the numbers listed below before the deadline in order to qualify for the program.    If requirements are not met by the date listed above you will be disqualified from the program and the credit valued at $600 towards your diabetic medications and supplies will be revoked. You will be able to reapply the following calendar year. [facility] Team        [phone] Option #7    Email: [email or web address]    Fax Number: [phone]

## 2018-11-16 NOTE — TELEPHONE ENCOUNTER
Incoming call received - patient states that she is no longer employed by [employer] ([employer]).  Will forward to  to be removed from the program.     China Patiño, PharmD, [address]: [phone] C: [phone]  Department, toll free [phone], option 7

## 2018-11-19 ENCOUNTER — CLINICAL DOCUMENTATION (OUTPATIENT)
Dept: PHARMACY | Facility: CLINIC | Age: 43
End: 2018-11-19

## 2018-12-18 ENCOUNTER — OFFICE VISIT (OUTPATIENT)
Dept: DERMATOLOGY | Age: 43
End: 2018-12-18
Payer: COMMERCIAL

## 2018-12-18 DIAGNOSIS — D22.9 MULTIPLE BENIGN NEVI: Primary | ICD-10-CM

## 2018-12-18 DIAGNOSIS — Z87.2 HISTORY OF ACTINIC KERATOSES: ICD-10-CM

## 2018-12-18 DIAGNOSIS — Z12.83 SCREENING EXAM FOR SKIN CANCER: ICD-10-CM

## 2018-12-18 DIAGNOSIS — L82.1 SEBORRHEIC KERATOSES: ICD-10-CM

## 2018-12-18 PROCEDURE — 99213 OFFICE O/P EST LOW 20 MIN: CPT | Performed by: DERMATOLOGY

## 2018-12-18 NOTE — PATIENT INSTRUCTIONS
what you will do instead of smoking. Tell your family and friends that you are going to try to quit and on what date. Put together a list of phone numbers of friends and family members who can give you support when you feel you might break down and have a cigarette. Before your quit day, put all tobacco products, ashtrays, and lighters away. 3. Put your plan into action  When you wake up on your quit day, start using a nicotine replacement method if you had planned to do that. For the first few days after you quit, you may have some signs of nicotine withdrawal. You may feel restless and cranky. You may find it hard to think. You might need to change your dose of nicotine if these signs upset you. Do not smoke! If you feel like you want to smoke, call a friend or family member who has agreed to help you. Also, there might be someone in your doctor's office you can call. Put into action your plans for doing things other than smoking. For example, when you feel the urge to smoke, you might take a walk. Or, you might visit friends who do not smoke. It is best to stay away from places where you used to smoke. 4. If you return to smoking--  Quitting smoking is not easy. Many people have to try several times before they succeed. If you start smoking again, call your primary care doctor's office soon to talk about what happened. Think about what you can do to keep from smoking when you try to quit again. Part of this handout is provided by the [employer]. More information is available at the American Lung Association https://long.com/.  This information provides a general overview and may not apply to everyone. Talk to your primary care doctor to find out if this information applies to you and to get more information on this subject.     Protecting Yourself From the Sun    · Apply broad spectrum water resistant sunscreen with an SPF of at least 30 to exposed areas

## 2018-12-18 NOTE — PROGRESS NOTES
For your well being, we encourage you to stop smoking or using tobacco products. Smoking and the use of other tobacco products, including cigars and smokeless tobacco, causes or worsens numerous diseases and conditions. Some products also expose nearby people to toxic secondhand smoke. Smoking is the leading cause of preventable death in the [place] causing over 438,000 deaths per year. Secondhand smoke is a serious health hazard for people of all ages, causing more than 41,000 deaths each year. Marijuana smoke contains many of the same toxins, irritants and carcinogens as tobacco smoke. Electronic cigarettes are a new tobacco product, and the potential health consequences and safety of these products are unknown. Smokeless Tobacco products are a known cause of cancer, and are not a safe alternative to cigarettes. 1. Make the decision to quit smoking  Stopping smoking is the best thing you can do for your health. If you smoke, you are more likely to get diseases of the lungs, heart, and brain. You are also more likely to get many kinds of cancer. After you quit, you will be less likely to get these diseases. Stopping smoking is hard--but you can do it! Your doctor can help you. 2. Get ready to quit  Once you decide to quit, make a plan with the help of your doctor. Here are some things you need to do:  Choose a day to quit. Talk to your primary care doctor about using the nicotine patch, gum, inhaler, or nasal spray to help you quit smoking. Getting nicotine some way other than in a cigarette can help make quitting easier. Talk to your primary care doctor about using a prescription medicine like bupropion (brand name: Zyban) to reduce your urge to smoke. If you decide to use a medicine, start taking it two weeks before your quit day. Talk with your primary care doctor about when you smoke. For example, you may smoke first thing in the morning, after a meal, or when you feel stressed.  Plan what you will do instead of smoking. Tell your family and friends that you are going to try to quit and on what date. Put together a list of phone numbers of friends and family members who can give you support when you feel you might break down and have a cigarette. Before your quit day, put all tobacco products, ashtrays, and lighters away. 3. Put your plan into action  When you wake up on your quit day, start using a nicotine replacement method if you had planned to do that. For the first few days after you quit, you may have some signs of nicotine withdrawal. You may feel restless and cranky. You may find it hard to think. You might need to change your dose of nicotine if these signs upset you. Do not smoke! If you feel like you want to smoke, call a friend or family member who has agreed to help you. Also, there might be someone in your doctor's office you can call. Put into action your plans for doing things other than smoking. For example, when you feel the urge to smoke, you might take a walk. Or, you might visit friends who do not smoke. It is best to stay away from places where you used to smoke. 4. If you return to smoking--  Quitting smoking is not easy. Many people have to try several times before they succeed. If you start smoking again, call your primary care doctor's office soon to talk about what happened. Think about what you can do to keep from smoking when you try to quit again. Part of this handout is provided by the [employer]. More information is available at the American Lung Association https://long.com/.  This information provides a general overview and may not apply to everyone. Talk to your primary care doctor to find out if this information applies to you and to get more information on this subject.

## 2019-01-02 RX ORDER — PRAVASTATIN SODIUM 40 MG
TABLET ORAL
Qty: 90 TABLET | Refills: 3 | Status: CANCELLED | OUTPATIENT
Start: 2019-01-02

## 2019-01-02 RX ORDER — PRAVASTATIN SODIUM 40 MG
40 TABLET ORAL DAILY
Qty: 90 TABLET | Refills: 0 | Status: SHIPPED | OUTPATIENT
Start: 2019-01-02 | End: 2019-02-20 | Stop reason: SDUPTHER

## 2019-01-02 RX ORDER — PANTOPRAZOLE SODIUM 20 MG/1
TABLET, DELAYED RELEASE ORAL
Qty: 90 TABLET | Refills: 0 | Status: SHIPPED | OUTPATIENT
Start: 2019-01-02 | End: 2019-02-20 | Stop reason: SDUPTHER

## 2019-01-04 RX ORDER — BUSPIRONE HYDROCHLORIDE 15 MG/1
15 TABLET ORAL 2 TIMES DAILY
Qty: 180 TABLET | Refills: 1 | Status: SHIPPED | OUTPATIENT
Start: 2019-01-04 | End: 2019-02-26 | Stop reason: SDUPTHER

## 2019-01-04 RX ORDER — ALBUTEROL SULFATE 90 UG/1
AEROSOL, METERED RESPIRATORY (INHALATION)
Qty: 3 INHALER | Refills: 1 | Status: SHIPPED | OUTPATIENT
Start: 2019-01-04 | End: 2019-06-05 | Stop reason: SDUPTHER

## 2019-01-04 RX ORDER — SERTRALINE HYDROCHLORIDE 100 MG/1
100 TABLET, FILM COATED ORAL DAILY
Qty: 180 TABLET | Refills: 1 | Status: SHIPPED | OUTPATIENT
Start: 2019-01-04 | End: 2019-01-18 | Stop reason: SDUPTHER

## 2019-01-16 LAB
AVERAGE GLUCOSE: NORMAL
CHOLESTEROL, TOTAL: 177 MG/DL
CHOLESTEROL/HDL RATIO: NORMAL
CREATININE, URINE: 114.2
HBA1C MFR BLD: 8.7 %
HDLC SERPL-MCNC: 59 MG/DL (ref 35–70)
LDL CHOLESTEROL CALCULATED: 85 MG/DL (ref 0–160)
MICROALBUMIN/CREAT 24H UR: <7 MG/G{CREAT}
MICROALBUMIN/CREAT UR-RTO: NORMAL
TRIGL SERPL-MCNC: 163 MG/DL
VLDLC SERPL CALC-MCNC: NORMAL MG/DL

## 2019-01-18 ENCOUNTER — OFFICE VISIT (OUTPATIENT)
Dept: FAMILY MEDICINE CLINIC | Age: 44
End: 2019-01-18
Payer: COMMERCIAL

## 2019-01-18 VITALS
OXYGEN SATURATION: 98 % | SYSTOLIC BLOOD PRESSURE: 118 MMHG | HEIGHT: 62 IN | HEART RATE: 96 BPM | BODY MASS INDEX: 35.7 KG/M2 | DIASTOLIC BLOOD PRESSURE: 68 MMHG | WEIGHT: 194 LBS

## 2019-01-18 DIAGNOSIS — E11.65 UNCONTROLLED TYPE 2 DIABETES MELLITUS WITH HYPERGLYCEMIA (HCC): ICD-10-CM

## 2019-01-18 DIAGNOSIS — Z00.00 ROUTINE GENERAL MEDICAL EXAMINATION AT A HEALTH CARE FACILITY: Primary | ICD-10-CM

## 2019-01-18 PROCEDURE — 99396 PREV VISIT EST AGE 40-64: CPT | Performed by: FAMILY MEDICINE

## 2019-01-18 RX ORDER — SERTRALINE HYDROCHLORIDE 100 MG/1
200 TABLET, FILM COATED ORAL DAILY
Qty: 180 TABLET | Refills: 1 | Status: SHIPPED | OUTPATIENT
Start: 2019-01-18 | End: 2019-09-30 | Stop reason: SDUPTHER

## 2019-01-18 RX ORDER — SERTRALINE HYDROCHLORIDE 100 MG/1
200 TABLET, FILM COATED ORAL DAILY
Qty: 180 TABLET | Refills: 1 | Status: SHIPPED | OUTPATIENT
Start: 2019-01-18 | End: 2019-01-18 | Stop reason: SDUPTHER

## 2019-01-19 PROBLEM — E11.65 UNCONTROLLED TYPE 2 DIABETES MELLITUS WITH HYPERGLYCEMIA (HCC): Status: ACTIVE | Noted: 2019-01-19

## 2019-01-19 ASSESSMENT — ENCOUNTER SYMPTOMS
EYES NEGATIVE: 1
RESPIRATORY NEGATIVE: 1
ALLERGIC/IMMUNOLOGIC NEGATIVE: 1

## 2019-01-29 ENCOUNTER — PATIENT MESSAGE (OUTPATIENT)
Dept: FAMILY MEDICINE CLINIC | Age: 44
End: 2019-01-29

## 2019-02-04 RX ORDER — BUSPIRONE HYDROCHLORIDE 15 MG/1
15 TABLET ORAL 2 TIMES DAILY
Qty: 180 TABLET | Refills: 1 | Status: CANCELLED | OUTPATIENT
Start: 2019-02-04

## 2019-02-20 RX ORDER — PRAVASTATIN SODIUM 40 MG
40 TABLET ORAL DAILY
Qty: 90 TABLET | Refills: 0 | Status: SHIPPED | OUTPATIENT
Start: 2019-02-20 | End: 2019-05-20 | Stop reason: SDUPTHER

## 2019-02-20 RX ORDER — PANTOPRAZOLE SODIUM 20 MG/1
TABLET, DELAYED RELEASE ORAL
Qty: 90 TABLET | Refills: 0 | Status: SHIPPED | OUTPATIENT
Start: 2019-02-20 | End: 2019-07-18 | Stop reason: SDUPTHER

## 2019-02-26 RX ORDER — BUSPIRONE HYDROCHLORIDE 15 MG/1
15 TABLET ORAL 2 TIMES DAILY
Qty: 180 TABLET | Refills: 1 | Status: SHIPPED | OUTPATIENT
Start: 2019-02-26 | End: 2019-09-17 | Stop reason: SDUPTHER

## 2019-03-11 ENCOUNTER — PATIENT MESSAGE (OUTPATIENT)
Dept: FAMILY MEDICINE CLINIC | Age: 44
End: 2019-03-11

## 2019-03-28 RX ORDER — BUDESONIDE AND FORMOTEROL FUMARATE DIHYDRATE 160; 4.5 UG/1; UG/1
2 AEROSOL RESPIRATORY (INHALATION) 2 TIMES DAILY
Qty: 3 INHALER | Refills: 2 | Status: SHIPPED | OUTPATIENT
Start: 2019-03-28 | End: 2020-02-08 | Stop reason: SDUPTHER

## 2019-04-17 NOTE — TELEPHONE ENCOUNTER
.  Last office visit 2019     Last written 2/3/17 3 refills    Next office visit scheduled 2019    Requested Prescriptions     Pending Prescriptions Disp Refills    fluticasone (FLONASE) 50 MCG/ACT nasal spray 3 Bottle 3     Si spray by Nasal route daily

## 2019-04-18 RX ORDER — FLUTICASONE PROPIONATE 50 MCG
1 SPRAY, SUSPENSION (ML) NASAL DAILY
Qty: 3 BOTTLE | Refills: 3 | Status: SHIPPED | OUTPATIENT
Start: 2019-04-18 | End: 2021-01-04 | Stop reason: ALTCHOICE

## 2019-05-20 RX ORDER — PRAVASTATIN SODIUM 40 MG
40 TABLET ORAL DAILY
Qty: 30 TABLET | Refills: 0 | Status: SHIPPED | OUTPATIENT
Start: 2019-05-20 | End: 2019-08-13 | Stop reason: SDUPTHER

## 2019-05-24 NOTE — TELEPHONE ENCOUNTER
.  Last office visit 1/18/2019     Last written 1-18-19 30 with 0      Next office visit scheduled 6/24/2019    Requested Prescriptions     Pending Prescriptions Disp Refills    empagliflozin (JARDIANCE) 10 MG tablet 30 tablet 0     Sig: Take 1 tablet by mouth daily

## 2019-06-05 NOTE — TELEPHONE ENCOUNTER
.  Last office visit 1/18/2019     Last written 1-4-19 3 with 1      Next office visit scheduled 6/24/2019    Requested Prescriptions     Pending Prescriptions Disp Refills    albuterol sulfate HFA (VENTOLIN HFA) 108 (90 Base) MCG/ACT inhaler [Pharmacy Med Name: VENTOLIN  90MCG  INH  HFA BASE] 36 g      Sig: USE 2 PUFFS EVERY 6 HOURS  AS NEEDED FOR WHEEZING

## 2019-06-06 RX ORDER — ALBUTEROL SULFATE 90 UG/1
AEROSOL, METERED RESPIRATORY (INHALATION)
Qty: 36 G | Refills: 0 | Status: SHIPPED | OUTPATIENT
Start: 2019-06-06 | End: 2019-08-06 | Stop reason: SDUPTHER

## 2019-06-24 ENCOUNTER — OFFICE VISIT (OUTPATIENT)
Dept: FAMILY MEDICINE CLINIC | Age: 44
End: 2019-06-24
Payer: COMMERCIAL

## 2019-06-24 VITALS
BODY MASS INDEX: 34.04 KG/M2 | OXYGEN SATURATION: 95 % | HEART RATE: 96 BPM | SYSTOLIC BLOOD PRESSURE: 122 MMHG | HEIGHT: 62 IN | DIASTOLIC BLOOD PRESSURE: 80 MMHG | WEIGHT: 185 LBS

## 2019-06-24 DIAGNOSIS — E11.65 UNCONTROLLED TYPE 2 DIABETES MELLITUS WITH HYPERGLYCEMIA (HCC): Primary | ICD-10-CM

## 2019-06-24 LAB — HBA1C MFR BLD: 7.1 %

## 2019-06-24 PROCEDURE — 99214 OFFICE O/P EST MOD 30 MIN: CPT | Performed by: FAMILY MEDICINE

## 2019-06-24 PROCEDURE — 83036 HEMOGLOBIN GLYCOSYLATED A1C: CPT | Performed by: FAMILY MEDICINE

## 2019-06-24 ASSESSMENT — PATIENT HEALTH QUESTIONNAIRE - PHQ9
SUM OF ALL RESPONSES TO PHQ9 QUESTIONS 1 & 2: 0
SUM OF ALL RESPONSES TO PHQ QUESTIONS 1-9: 0
1. LITTLE INTEREST OR PLEASURE IN DOING THINGS: 0
SUM OF ALL RESPONSES TO PHQ QUESTIONS 1-9: 0
2. FEELING DOWN, DEPRESSED OR HOPELESS: 0

## 2019-06-25 NOTE — PROGRESS NOTES
2019     Kalyn Parisi (:  1975) is a 37 y.o. female, here for evaluation of the following medical concerns:    HPI  Diabetes Mellitus Type 2: Current symptoms/problems include none. Medication compliance:  compliant all of the time  Diabetic diet compliance:  compliant most of the time, was doing great for several months but has been having a hard time getting back on track after going on vacation 2 months ago. Weight trend: decreasing  Current exercise: no regular exercise  Barriers: none    Home blood sugar records: patient does not test  Any episodes of hypoglycemia? no  Eye exam current (within one year): yes   reports that she has been smoking. She has a 18.00 pack-year smoking history. She has never used smokeless tobacco.   Daily Aspirin? No: too young    Lab Results   Component Value Date    LABA1C 7.1 2019    LABA1C 8.7 2019    LABA1C 7.4 2018     Lab Results   Component Value Date    LABMICR <1.20 2017    CREATININE 0.6 2018     Lab Results   Component Value Date    ALT 18 2018    AST 21 2018     Lab Results   Component Value Date    CHOL 177 2019    TRIG 163 2019    HDL 59 2019    LDLCALC 85 2019          Review of Systems   Eyes: Negative for visual disturbance. Endocrine: Negative for polydipsia and polyuria. Neurological: Negative for numbness. Prior to Visit Medications    Medication Sig Taking?  Authorizing Provider   albuterol sulfate HFA (VENTOLIN HFA) 108 (90 Base) MCG/ACT inhaler USE 2 PUFFS EVERY 6 HOURS  AS NEEDED FOR WHEEZING Yes Jayy Kate MD   empagliflozin (JARDIANCE) 10 MG tablet Take 1 tablet by mouth daily Yes Jayy aKte MD   pravastatin (PRAVACHOL) 40 MG tablet TAKE 1 TABLET BY MOUTH  DAILY Yes Melvin Nicole MD   fluticasone (FLONASE) 50 MCG/ACT nasal spray 1 spray by Nasal route daily Yes Jayy Kate MD   budesonide-formoterol (SYMBICORT) 160-4.5 MCG/ACT AERO Inhale 2 puffs into the lungs 2 times daily Yes Janae Mcdaniels MD   busPIRone (BUSPAR) 15 MG tablet Take 15 mg by mouth 2 times daily Yes Janae Mcdaniels MD   pantoprazole (PROTONIX) 20 MG tablet TAKE 1 TABLET BY MOUTH  DAILY Yes Sharmila Membreno MD   metFORMIN (GLUCOPHAGE) 1000 MG tablet TAKE 1 TABLET BY MOUTH TWO  TIMES DAILY Yes Sharmila Membreno MD   Liraglutide (VICTOZA) 18 MG/3ML SOPN SC injection INJECT 1.8 MG INTO THE SKIN DAILY Yes Sharmila Membreno MD   sertraline (ZOLOFT) 100 MG tablet Take 2 tablets by mouth daily Yes Janae Mcdaniels MD   Insulin Pen Needle (EASY TOUCH PEN NEEDLES) 31G X 5 MM MISC Inject 1 each into the skin daily Yes Sharmila Membreno MD   EASY TOUCH PEN NEEDLES 31G X 5 MM Robel Pump Yes Sharmila Membreno MD   pravastatin (PRAVACHOL) 40 MG tablet TAKE 1 TABLET BY MOUTH ONE TIME DAILY Yes Sharmila Membreno MD   Blood Glucose Monitoring Suppl (AGAMATRIX PRESTO) w/Device KIT 1 each by Does not apply route 3 times daily (before meals) Yes Sharmila Membreno MD   glucose blood VI test strips (AGAMATRIX PRESTO TEST) strip 1 each by In Vitro route 3 times daily As needed. Yes Sharmila Membreno MD   AGAMATRIX ULTRA-THIN LANCETS MISC 1 each by Does not apply route 3 times daily (before meals) Yes Sharmila Membreno MD   Cholecalciferol (VITAMIN D3) 2000 UNITS CAPS Take by mouth Yes Historical Provider, MD   Multiple Vitamins-Minerals (THERAPEUTIC MULTIVITAMIN-MINERALS) tablet Take 1 tablet by mouth daily Yes Historical Provider, MD   B Complex-Folic Acid (B COMPLEX-VITAMIN B12) TABS Take  by mouth. Yes Pernell Mccurdy MD   calcium carbonate (OSCAL) 500 MG TABS tablet Take 500 mg by mouth daily. Yes Historical Provider, MD   cetirizine (ZYRTEC) 10 MG tablet Take 10 mg by mouth daily.  Yes Historical Provider, MD        Social History     Tobacco Use    Smoking status: Current Every Day Smoker     Packs/day: 1.00     Years: 18.00     Pack years: 18.00     Last attempt to quit: 1/10/2013     Years since quittin.4    Smokeless tobacco: Never Used    Tobacco comment: 2013 quit   Substance Use Topics    Alcohol use: Yes     Comment: 1 drink a month        Vitals:    19 0904   BP: 122/80   Pulse: 96   SpO2: 95%   Weight: 185 lb (83.9 kg)   Height: 5' 2\" (1.575 m)     Estimated body mass index is 33.84 kg/m² as calculated from the following:    Height as of this encounter: 5' 2\" (1.575 m). Weight as of this encounter: 185 lb (83.9 kg). Physical Exam   Constitutional: She appears well-developed and well-nourished. No distress. Cardiovascular:   Pulses:       Dorsalis pedis pulses are 2+ on the right side, and 2+ on the left side. Posterior tibial pulses are 2+ on the right side, and 2+ on the left side. Pulmonary/Chest: Effort normal.   Musculoskeletal:        Right foot: There is normal range of motion and no deformity. Left foot: There is normal range of motion and no deformity. Feet:   Right Foot:   Protective Sensation: 10 sites tested. 10 sites sensed. Skin Integrity: Negative for ulcer, blister, skin breakdown, erythema, warmth, callus or dry skin. Left Foot:   Protective Sensation: 10 sites tested. 10 sites sensed. Skin Integrity: Negative for ulcer, blister, skin breakdown, erythema, warmth, callus or dry skin. Neurological: She is alert. She has normal strength. No sensory deficit. Reflex Scores:       Patellar reflexes are 2+ on the right side and 2+ on the left side. Achilles reflexes are 2+ on the right side and 2+ on the left side. Normal proprioception of big toes bilaterally   Skin: Skin is warm, dry and intact. No lesion noted. She is not diaphoretic. No cyanosis. Nails show no clubbing. Nursing note and vitals reviewed. Results for POC orders placed in visit on 19   POCT glycosylated hemoglobin (Hb A1C)   Result Value Ref Range    Hemoglobin A1C 7.1 %        ASSESSMENT/PLAN:  1.  Uncontrolled type 2 diabetes mellitus with hyperglycemia ([address]  Discussed general issues about diabetes pathophysiology and management. Counseling at today's visit: discussed the need for weight loss, focused on the need for regular aerobic exercise and discussed the advantages of a diet low in carbohydrates. Educational material distributed. Addressed ADA diet. Encouraged aerobic exercise. Discussed foot care. Continued metformin, Jardiance, Victoza; see  medication orders.   -  DIABETES FOOT EXAM  - POCT glycosylated hemoglobin (Hb A1C)      Return in about 3 months (around 9/24/2019) for Diabetes. An electronic signature was used to authenticate this note.     --Verónica Laurent MD on 6/25/2019 at 5:39 AM

## 2019-06-26 RX ORDER — EMPAGLIFLOZIN 10 MG/1
TABLET, FILM COATED ORAL
Qty: 30 TABLET | Refills: 5 | Status: SHIPPED | OUTPATIENT
Start: 2019-06-26 | End: 2019-07-18 | Stop reason: SDUPTHER

## 2019-07-19 RX ORDER — PANTOPRAZOLE SODIUM 20 MG/1
TABLET, DELAYED RELEASE ORAL
Qty: 90 TABLET | Refills: 1 | Status: SHIPPED | OUTPATIENT
Start: 2019-07-19 | End: 2019-11-16 | Stop reason: SDUPTHER

## 2019-08-06 RX ORDER — ALBUTEROL SULFATE 90 UG/1
AEROSOL, METERED RESPIRATORY (INHALATION)
Qty: 36 G | Refills: 2 | Status: SHIPPED | OUTPATIENT
Start: 2019-08-06 | End: 2019-11-16 | Stop reason: SDUPTHER

## 2019-08-13 RX ORDER — PRAVASTATIN SODIUM 40 MG
TABLET ORAL
Qty: 90 TABLET | Refills: 3 | Status: SHIPPED | OUTPATIENT
Start: 2019-08-13 | End: 2019-08-19 | Stop reason: SDUPTHER

## 2019-09-12 ENCOUNTER — NURSE TRIAGE (OUTPATIENT)
Dept: OTHER | Facility: CLINIC | Age: 44
End: 2019-09-12

## 2019-09-19 RX ORDER — BUSPIRONE HYDROCHLORIDE 15 MG/1
15 TABLET ORAL 2 TIMES DAILY
Qty: 180 TABLET | Refills: 1 | Status: SHIPPED | OUTPATIENT
Start: 2019-09-19 | End: 2020-03-13

## 2019-09-30 ENCOUNTER — OFFICE VISIT (OUTPATIENT)
Dept: FAMILY MEDICINE CLINIC | Age: 44
End: 2019-09-30
Payer: COMMERCIAL

## 2019-09-30 VITALS
WEIGHT: 182.6 LBS | DIASTOLIC BLOOD PRESSURE: 70 MMHG | TEMPERATURE: 98.7 F | HEART RATE: 91 BPM | SYSTOLIC BLOOD PRESSURE: 120 MMHG | BODY MASS INDEX: 33.6 KG/M2 | OXYGEN SATURATION: 97 % | HEIGHT: 62 IN

## 2019-09-30 DIAGNOSIS — E11.65 UNCONTROLLED TYPE 2 DIABETES MELLITUS WITH HYPERGLYCEMIA (HCC): Primary | ICD-10-CM

## 2019-09-30 LAB — HBA1C MFR BLD: 7.2 %

## 2019-09-30 PROCEDURE — 99213 OFFICE O/P EST LOW 20 MIN: CPT | Performed by: FAMILY MEDICINE

## 2019-09-30 PROCEDURE — 83036 HEMOGLOBIN GLYCOSYLATED A1C: CPT | Performed by: FAMILY MEDICINE

## 2019-09-30 RX ORDER — SERTRALINE HYDROCHLORIDE 100 MG/1
200 TABLET, FILM COATED ORAL DAILY
Qty: 180 TABLET | Refills: 3 | Status: SHIPPED | OUTPATIENT
Start: 2019-09-30 | End: 2020-06-07 | Stop reason: SDUPTHER

## 2019-09-30 NOTE — PROGRESS NOTES
2019     Africa Rudolph (:  1975) is a 37 y.o. female, here for evaluation of the following medical concerns:    Chief complaint: Patient presents with:  Diabetes     Past medical, surgical, family and social history, as well as current medications and allergies, were reviewed and updated with the patient. Diabetes Mellitus Type 2: Current symptoms/problems include none. States she was on prednisone for asthma recently, thinks that might have have an effect on blood sugar. Medication compliance:  noncompliant: has missed several doses of diabetes meds  Diabetic diet compliance:  compliant most of the time,  Weight trend: decreasing  Current exercise: no regular exercise  Barriers: none    Home blood sugar records: patient does not test  Any episodes of hypoglycemia? no  Eye exam current (within one year): yes   reports that she has been smoking. She has a 18.00 pack-year smoking history. She has never used smokeless tobacco.   Daily Aspirin? Yes    Lab Results   Component Value Date    LABA1C 7.2 2019    LABA1C 7.1 2019    LABA1C 8.7 2019     Lab Results   Component Value Date    LABMICR <1.20 2017    CREATININE 0.6 2018     Lab Results   Component Value Date    ALT 18 2018    AST 21 2018     Lab Results   Component Value Date    CHOL 177 2019    TRIG 163 2019    HDL 59 2019    LDLCALC 85 2019          Review of Systems   Constitutional: Negative for unexpected weight change. Neurological: Negative for numbness. Prior to Visit Medications    Medication Sig Taking?  Authorizing Provider   sertraline (ZOLOFT) 100 MG tablet Take 2 tablets by mouth daily Yes Ashlyn Roche MD   busPIRone (BUSPAR) 15 MG tablet Take 15 mg by mouth 2 times daily Yes Ashlyn Roche MD   pravastatin (PRAVACHOL) 40 MG tablet TAKE 1 TABLET BY MOUTH ONE TIME DAILY Yes Ashlyn Roche MD   Liraglutide (VICTOZA) 18 MG/3ML SOPN SC injection INJECT 1.8 Last attempt to quit: 1/10/2013     Years since quittin.7    Smokeless tobacco: Never Used    Tobacco comment: 2013 quit   Substance Use Topics    Alcohol use: Yes     Comment: 1 drink a month        Vitals:    19 1035   BP: 120/70   Site: Right Upper Arm   Position: Sitting   Cuff Size: Small Adult   Pulse: 91   Temp: 98.7 °F (37.1 °C)   TempSrc: Oral   SpO2: 97%   Weight: 182 lb 9.6 oz (82.8 kg)   Height: 5' 2\" (1.575 m)     Estimated body mass index is 33.4 kg/m² as calculated from the following:    Height as of this encounter: 5' 2\" (1.575 m). Weight as of this encounter: 182 lb 9.6 oz (82.8 kg). Physical Exam   Constitutional: She appears well-developed and well-nourished. No distress. Skin: She is not diaphoretic. Nursing note and vitals reviewed. Results for POC orders placed in visit on 19   POCT glycosylated hemoglobin (Hb A1C)   Result Value Ref Range    Hemoglobin A1C 7.2 %        ASSESSMENT/PLAN:  1. Uncontrolled type 2 diabetes mellitus with hyperglycemia (HCC) - worsening  Likely up due to missing med dose and taking prednisone a few weeks ago. Patient to start keto diet, take meds regularly, recheck in 3 months.   - POCT glycosylated hemoglobin (Hb A1C)      Return in about 3 months (around 2019) for Diabetes. An electronic signature was used to authenticate this note.     --Linnea Olszewski, MD on 2019 at 2:42 PM

## 2019-11-19 RX ORDER — PANTOPRAZOLE SODIUM 20 MG/1
TABLET, DELAYED RELEASE ORAL
Qty: 90 TABLET | Refills: 1 | Status: SHIPPED | OUTPATIENT
Start: 2019-11-19 | End: 2020-09-01 | Stop reason: SDUPTHER

## 2020-02-10 RX ORDER — BUDESONIDE AND FORMOTEROL FUMARATE DIHYDRATE 160; 4.5 UG/1; UG/1
2 AEROSOL RESPIRATORY (INHALATION) 2 TIMES DAILY
Qty: 3 INHALER | Refills: 2 | Status: SHIPPED | OUTPATIENT
Start: 2020-02-10 | End: 2020-03-16 | Stop reason: SDUPTHER

## 2020-02-19 NOTE — TELEPHONE ENCOUNTER
.  Last office visit 9/30/2019     Last written 11-19-19 27ml with 1      Next office visit scheduled no future ov     Requested Prescriptions     Pending Prescriptions Disp Refills    Liraglutide (VICTOZA) 18 MG/3ML SOPN SC injection 27 mL 1     Sig: INJECT 1.8 MG INTO THE SKIN DAILY

## 2020-03-12 ENCOUNTER — PATIENT MESSAGE (OUTPATIENT)
Dept: FAMILY MEDICINE CLINIC | Age: 45
End: 2020-03-12

## 2020-03-12 NOTE — LETTER
[address] [facility] [address]  [facility] [address]  Phone: [phone]  Fax: [phone]    Abhijit Castillo MD        March 20, 2020     Patient: Dick Tadeo   YOB: 1975   Date of Visit: 3/12/2020       To Whom It May Concern: It is my medical opinion that Dick Tadeo should be allowed to work from home until improvement of the COVID-19 outbreak. She has several high-risk conditions that would likely lead to severe illness if she contracted the infection. If you have any questions or concerns, please don't hesitate to call.     Sincerely,         Abhijit Castillo MD

## 2020-03-13 RX ORDER — BUSPIRONE HYDROCHLORIDE 15 MG/1
TABLET ORAL
Qty: 180 TABLET | Refills: 1 | Status: SHIPPED | OUTPATIENT
Start: 2020-03-13 | End: 2020-06-07 | Stop reason: SDUPTHER

## 2020-04-19 ENCOUNTER — E-VISIT (OUTPATIENT)
Dept: FAMILY MEDICINE CLINIC | Age: 45
End: 2020-04-19
Payer: COMMERCIAL

## 2020-04-19 PROCEDURE — 99421 OL DIG E/M SVC 5-10 MIN: CPT | Performed by: FAMILY MEDICINE

## 2020-06-08 RX ORDER — SERTRALINE HYDROCHLORIDE 100 MG/1
200 TABLET, FILM COATED ORAL DAILY
Qty: 180 TABLET | Refills: 1 | Status: SHIPPED | OUTPATIENT
Start: 2020-06-08 | End: 2021-02-06

## 2020-06-08 RX ORDER — EMPAGLIFLOZIN 10 MG/1
TABLET, FILM COATED ORAL
Qty: 90 TABLET | Refills: 1 | Status: SHIPPED | OUTPATIENT
Start: 2020-06-08 | End: 2020-12-31 | Stop reason: SDUPTHER

## 2020-06-08 RX ORDER — LIRAGLUTIDE 6 MG/ML
INJECTION SUBCUTANEOUS
Qty: 27 ML | Refills: 1 | Status: SHIPPED | OUTPATIENT
Start: 2020-06-08 | End: 2020-12-04

## 2020-06-08 RX ORDER — BUSPIRONE HYDROCHLORIDE 15 MG/1
15 TABLET ORAL 2 TIMES DAILY
Qty: 180 TABLET | Refills: 1 | Status: SHIPPED | OUTPATIENT
Start: 2020-06-08 | End: 2020-12-04

## 2020-06-08 NOTE — TELEPHONE ENCOUNTER
No future ov  Last ov 4/19/20 E visit DM follow up    Please advise on directions for Buspar current rx request and last rx request have different sig

## 2020-06-22 ENCOUNTER — TELEPHONE (OUTPATIENT)
Dept: FAMILY MEDICINE CLINIC | Age: 45
End: 2020-06-22

## 2020-06-22 NOTE — TELEPHONE ENCOUNTER
[facility] received a call from:    Name of Caller: Bria Puri    Relationship to patient: self    Organization name: n/a    Best contact number: [phone]    Reason for call: Pt is calling because she states Dr. Marques Escobar would take her daughter in as a pt.  Please advise

## 2020-06-22 NOTE — TELEPHONE ENCOUNTER
LM for patient ok to schedule daughter with Dr Anurag Corbett; advised next new patient slot available is 8-25

## 2020-08-11 RX ORDER — PRAVASTATIN SODIUM 40 MG
TABLET ORAL
Qty: 90 TABLET | Refills: 3 | Status: SHIPPED | OUTPATIENT
Start: 2020-08-11 | End: 2021-07-14

## 2020-09-01 RX ORDER — PANTOPRAZOLE SODIUM 20 MG/1
TABLET, DELAYED RELEASE ORAL
Qty: 90 TABLET | Refills: 1 | Status: SHIPPED | OUTPATIENT
Start: 2020-09-01 | End: 2021-02-06

## 2020-09-01 NOTE — TELEPHONE ENCOUNTER
.  Last office visit 4/19/2020     Last written 11-19-19 90 with 1      Next office visit scheduled Visit date not found    Requested Prescriptions     Pending Prescriptions Disp Refills    pantoprazole (PROTONIX) 20 MG tablet 90 tablet 1     Sig: TAKE 1 TABLET BY MOUTH  DAILY

## 2020-12-03 NOTE — TELEPHONE ENCOUNTER
.  Last office visit 4/19/2020     Last written 6-8-2020 180 with 1      Next office visit scheduled 1/4/2021    Requested Prescriptions     Pending Prescriptions Disp Refills    busPIRone (BUSPAR) 15 MG tablet [Pharmacy Med Name: busPIRone 15 mg tablet (BUSPAR)] 180 tablet 1     Sig: Take 1 tablet (15 mg total) by mouth 2 times a day.

## 2020-12-03 NOTE — TELEPHONE ENCOUNTER
.  Last office visit 4/19/2020     Last written 6-8-2020 27ml with 1      Next office visit scheduled 12/3/2020    Requested Prescriptions     Pending Prescriptions Disp Refills    Liraglutide (VICTOZA) 18 MG/3ML SOPN SC injection [Pharmacy Med Name: liraglutide 0.6 mg/0.1 mL (18 mg/3 mL) subcutaneous pen injector] 27 mL 0     Sig: Inject 1.8mg into the skin daily.

## 2020-12-04 RX ORDER — LIRAGLUTIDE 6 MG/ML
INJECTION SUBCUTANEOUS
Qty: 27 ML | Refills: 0 | Status: SHIPPED | OUTPATIENT
Start: 2020-12-04 | End: 2021-03-18

## 2020-12-04 RX ORDER — BUSPIRONE HYDROCHLORIDE 15 MG/1
TABLET ORAL
Qty: 180 TABLET | Refills: 1 | Status: SHIPPED | OUTPATIENT
Start: 2020-12-04 | End: 2021-06-21 | Stop reason: SDUPTHER

## 2020-12-29 LAB — PAP SMEAR, EXTERNAL: NORMAL

## 2020-12-31 RX ORDER — EMPAGLIFLOZIN 10 MG/1
TABLET, FILM COATED ORAL
Qty: 90 TABLET | Refills: 1 | Status: SHIPPED | OUTPATIENT
Start: 2020-12-31 | End: 2021-04-05 | Stop reason: SDUPTHER

## 2020-12-31 NOTE — TELEPHONE ENCOUNTER
.  Last office visit 4/19/2020     Last written 6-8-2020 90 with 1      Next office visit scheduled 1/4/2021    Requested Prescriptions     Pending Prescriptions Disp Refills    empagliflozin (JARDIANCE) 10 MG tablet 90 tablet 1     Sig: TAKE 1 TABLET BY MOUTH DAILY

## 2021-01-04 ENCOUNTER — OFFICE VISIT (OUTPATIENT)
Dept: FAMILY MEDICINE CLINIC | Age: 46
End: 2021-01-04
Payer: COMMERCIAL

## 2021-01-04 VITALS
WEIGHT: 181 LBS | DIASTOLIC BLOOD PRESSURE: 68 MMHG | BODY MASS INDEX: 33.31 KG/M2 | OXYGEN SATURATION: 97 % | SYSTOLIC BLOOD PRESSURE: 120 MMHG | TEMPERATURE: 97.5 F | HEIGHT: 62 IN | HEART RATE: 91 BPM

## 2021-01-04 DIAGNOSIS — E11.9 TYPE 2 DIABETES MELLITUS WITHOUT COMPLICATION, WITHOUT LONG-TERM CURRENT USE OF INSULIN (HCC): Primary | ICD-10-CM

## 2021-01-04 DIAGNOSIS — G47.00 INSOMNIA, UNSPECIFIED TYPE: ICD-10-CM

## 2021-01-04 DIAGNOSIS — Z11.59 NEED FOR HEPATITIS C SCREENING TEST: ICD-10-CM

## 2021-01-04 DIAGNOSIS — D23.72: ICD-10-CM

## 2021-01-04 DIAGNOSIS — Z23 NEED FOR PNEUMOCOCCAL VACCINATION: ICD-10-CM

## 2021-01-04 DIAGNOSIS — E78.2 MIXED HYPERLIPIDEMIA: ICD-10-CM

## 2021-01-04 LAB
CREATININE URINE POCT: 100
HBA1C MFR BLD: 6.8 %
MICROALBUMIN/CREAT 24H UR: 10 MG/G{CREAT}
MICROALBUMIN/CREAT UR-RTO: <30

## 2021-01-04 PROCEDURE — 99214 OFFICE O/P EST MOD 30 MIN: CPT | Performed by: FAMILY MEDICINE

## 2021-01-04 PROCEDURE — 90471 IMMUNIZATION ADMIN: CPT | Performed by: FAMILY MEDICINE

## 2021-01-04 PROCEDURE — 90732 PPSV23 VACC 2 YRS+ SUBQ/IM: CPT | Performed by: FAMILY MEDICINE

## 2021-01-04 PROCEDURE — 82044 UR ALBUMIN SEMIQUANTITATIVE: CPT | Performed by: FAMILY MEDICINE

## 2021-01-04 PROCEDURE — 83036 HEMOGLOBIN GLYCOSYLATED A1C: CPT | Performed by: FAMILY MEDICINE

## 2021-01-04 ASSESSMENT — PATIENT HEALTH QUESTIONNAIRE - PHQ9
2. FEELING DOWN, DEPRESSED OR HOPELESS: 0
SUM OF ALL RESPONSES TO PHQ QUESTIONS 1-9: 2
SUM OF ALL RESPONSES TO PHQ9 QUESTIONS 1 & 2: 2
1. LITTLE INTEREST OR PLEASURE IN DOING THINGS: 2
SUM OF ALL RESPONSES TO PHQ QUESTIONS 1-9: 2

## 2021-01-04 NOTE — PATIENT INSTRUCTIONS
Guided imagery can take you to your own restful place. To give guided imagery a try, follow these steps:  · Lean back comfortably in your chair. If you can, close your eyes. Put your arms on the armrests, or fold your hands in your lap. · Take a deep breath through your nose. Breathe in slowly, and then let the air out completely through your mouth. · Do it again slowly. Keep breathing like this. Gather up any tension in your body, and send it out with every breath. · Let a feeling of warmth spread from your lungs to your neck and head, down your arms to your fingertips, through your body and into your legs, all the way down to your toes. Stay this way for a moment. · Now imagine a pleasant day. You're at a park or at a place you've visited in the past where you felt at peace. · In your mind's eye, look at what lies in front of you. Maybe you see the sun, filtered through trees. Maybe clouds are drifting by. · Look to one side, and then the other. Notice the feel of the air around you. Notice how it feels on your face and on your arms. · Stay here for a while. Let it become real for you. Follow-up care is a key part of your treatment and safety. Be sure to make and go to all appointments, and call your doctor if you are having problems. It's also a good idea to know your test results and keep a list of the medicines you take. Where can you learn more? Go to https://Stronghold Technologyjusten.[email or web address] org and sign in to your [employer] account. Enter N291 in the bubl box to learn more about \"Learning About Guided Imagery for Stress. \"     If you do not have an account, please click on the \"Sign Up Now\" link. Current as of: December 16, 2019               Content Version: 12.6  © 5767-8581 [employer]. Care instructions adapted under license by [facility] ([facility]).  If you have questions about a medical condition or this instruction, always ask your healthcare professional. [place] [employer] disclaims any warranty or liability for your use of this information. Patient Education        Learning About Progressive Muscle Relaxation for Stress  What are progressive muscle relaxation and stress? Stress is what you feel when you have to handle more than you are used to. A lot of things can cause stress. You may feel stress when you go on a job interview, take a test, or run a race. This kind of short-term stress is normal and even useful. It can help you if you need to work hard or react quickly. Stress also can last a long time. Long-term stress is caused by stressful situations or events. Examples of long-term stress include long-term health problems, ongoing problems at work, and conflicts in your family. Long-term stress can harm your health. When you have anxiety or stress in your life, one of the ways your body responds is with muscle tension. Progressive muscle relaxation is a method that helps relieve that tension. How does progressive muscle relaxation reduce stress? The body responds to stress with muscle tension, which can cause pain or discomfort. In turn, tense muscles relay to the body that it's stressed. That keeps the cycle of stress and muscle tension going. Progressive muscle relaxation helps break this cycle by reducing muscle tension and general mental anxiety. This method often helps people get to sleep. How do you do progressive muscle relaxation? To do progressive muscle relaxation, first you tense a group of muscles as you breathe in. Then you relax them as you breathe out. Notice how your muscles feel when you relax them. You work on your muscle groups in a certain order. Through practice, you can learn to feel the difference between a tensed muscle and a relaxed muscle. Then you can learn how to turn on this relaxed state at the first sign of a muscle tensing up due to stress.   Practicing this method for a few weeks will help you get better at this skill. In time you'll be able to use this method to relieve stress. When you first start, it may help to use an audio recording until you learn all the muscle groups in order. Check online for these recordings. Choose a place where you won't be interrupted. It should have space for you to lie down on your back and stretch out comfortably, such as on a carpeted floor. Follow-up care is a key part of your treatment and safety. Be sure to make and go to all appointments, and call your doctor if you are having problems. It's also a good idea to know your test results and keep a list of the medicines you take. Where can you learn more? Go to https://[employer].1CLICK. org and sign in to your [facility] account. Enter K803 in the epacube box to learn more about \"Learning About Progressive Muscle Relaxation for Stress. \"     If you do not have an account, please click on the \"Sign Up Now\" link. Current as of: December 16, 2019               Content Version: 12.6  © 5026-8823 [employer]. Care instructions adapted under license by [facility] ([facility]). If you have questions about a medical condition or this instruction, always ask your healthcare professional. [address] any warranty or liability for your use of this information.

## 2021-01-10 NOTE — PROGRESS NOTES
Adolfo Mckinney (:  1975) is a 39 y.o. female,Established patient, here for evaluation of the following chief complaint(s):  Diabetes, Immunizations (would like pneumovax last one 2014), Other (skin lesion), Hyperlipidemia, and Insomnia      ASSESSMENT/PLAN:  1. Type 2 diabetes mellitus without complication, without long-term current use of insulin (HCC)  -     POCT glycosylated hemoglobin (Hb A1C)  -     POCT microalbumin  -     HM DIABETES FOOT EXAM  -     CBC Auto Differential  -     Comprehensive Metabolic Panel  -     TSH with Reflex  -     Lipid Panel  -     Vitamin B12  This problem is well controlled. Pt should continue current medications (Jardiance, Victoza, metformin) at current dose. Will get the above labs for overall assessment and looking for any medication adverse effects, as well as sequelae from DM. Follow up in 6 months. Pneumovax given today due to her h/o DM and it has been >5 years since her last one. 2. Dermatofibroma of foot, left  Reassurance given this is benign in nature. If she would like it removed, she may see her dermatologist again, but explained it will need complete excision to be removed successfully. 3. Insomnia, unspecified type  OTC melatonin. Regular sleep schedule. Avoid caffeine. Get regular exercise. Avoid screen time at night at least 30 minutes or more prior to going to bed. 4. Mixed hyperlipidemia  Uncertain control as it has not been checked in 2 years. Continue pravastatin until lab results obtain, will adjust dosing if necessary at that time. 5. Need for hepatitis C screening test  -     HEPATITIS C ANTIBODY      Return in about 6 months (around 2021) for Diabetes. SUBJECTIVE/OBJECTIVE:  HPI:   Chief Complaint   Patient presents with    Diabetes    Immunizations     would like pneumovax last one 2014    Other     skin lesion    Hyperlipidemia    Insomnia        Diabetes Mellitus Type 2: Here for DM follow.   Denies any concerns related to DM. Has been taking medication regularly, including Jardiance, Victoza, metformin, pravastatin. Current symptoms/problems include none. Medication compliance:  compliant most of the time  Diabetic diet compliance:  compliant most of the time,  Weight trend: stable  Eye exam current (within one year): yes   reports that she has been smoking. She has a 18.00 pack-year smoking history. She has never used smokeless tobacco.   Daily Aspirin? No  Known diabetic complications: none    Lab Results   Component Value Date    LABA1C 6.8 01/04/2021    LABA1C 7.2 09/30/2019    LABA1C 7.1 06/24/2019     Lab Results   Component Value Date    LABMICR <1.20 12/04/2017    CREATININE 0.6 02/24/2018     Lab Results   Component Value Date    ALT 18 02/24/2018    AST 21 02/24/2018     No components found for: CHLPL  Lab Results   Component Value Date    TRIG 163 01/16/2019     Lab Results   Component Value Date    HDL 59 01/16/2019     Lab Results   Component Value Date    LDLCALC 85 01/16/2019           Has hyperlipidemia, taking pravastatin. Has been 2 years since her lipids and LFTs were checked. Denies any problems from medication. Has enlarging skin lesion on left foot. Had it frozen by dermatologist in October, states it is has gotten a little bigger and more raised since that time. It is irritating at times, especially when shaving, as she cuts it on accident. Having more trouble sleeping in the past couple of months. Review of Systems   Constitutional: Positive for fatigue. Psychiatric/Behavioral: Positive for sleep disturbance. Physical Exam  Vitals signs and nursing note reviewed. Constitutional:       General: She is not in acute distress. Appearance: She is well-developed. She is not diaphoretic. Cardiovascular:      Pulses:           Dorsalis pedis pulses are 2+ on the right side and 2+ on the left side. Posterior tibial pulses are 2+ on the right side and 2+ on the left side. Musculoskeletal:      Right foot: Normal range of motion. No deformity. Left foot: Normal range of motion. No deformity. Feet:      Right foot:      Protective Sensation: 10 sites tested. 10 sites sensed. Skin integrity: No ulcer, blister, skin breakdown, erythema, warmth, callus or dry skin. Left foot:      Protective Sensation: 10 sites tested. 10 sites sensed. Skin integrity: No ulcer, blister, skin breakdown, erythema, warmth, callus or dry skin. Skin:     Comments: Round, 0.5 mm solitary, firm, slightly hyperpigmented nodule on proximal anterior left foot     Neurological:      Mental Status: She is alert. Sensory: No sensory deficit. Deep Tendon Reflexes:      Reflex Scores:       Achilles reflexes are 2+ on the right side and 2+ on the left side. Comments: Normal proprioception of big toes bilaterally         Results for POC orders placed in visit on 01/04/21   POCT microalbumin   Result Value Ref Range    Microalb, Ur 10     Creatinine Ur POCT 100     Microalbumin Creatinine Ratio <30    POCT glycosylated hemoglobin (Hb A1C)   Result Value Ref Range    Hemoglobin A1C 6.8 %         An electronic signature was used to authenticate this note.     --Arturo Rodgers MD

## 2021-01-19 ENCOUNTER — TELEPHONE (OUTPATIENT)
Dept: ADMINISTRATIVE | Age: 46
End: 2021-01-19

## 2021-01-20 NOTE — TELEPHONE ENCOUNTER
Called pt and she has no idea what this could be or why anyone would be calling. Diane Hodge can you help to investigate?

## 2021-01-20 NOTE — TELEPHONE ENCOUNTER
Jluisqi Lopez @  [phone] ext [phone] ([employer] representing [facility]). He states that he sent ODR form for life insurance. He is asking if we have received the paperwork and if it can be filled out today. Adv that Dr. WALLACE [facility] is not in the office today. The paperwork has been received. Told Jluis Lopez that we probably will have to have an authorization to release PHI. Jluis Lopez said they have been trying to reach pt, but she's not responded yet. He asked if we could call her to find out if she will sign a release. LVM for pt to call office to discuss. Blanquita Blackman called back to say that someone from her ins co has called and they discussed her adding coverage, but she has decided against doing this. She is not interested and doesn't want information to be sent to [facility] at this time. Tried to call Jluis Lopez to let him know, but the extension he gave for [facility] retrievals didn't go through (call kept ending in a busy signal after silence for about a minute).

## 2021-02-06 RX ORDER — PANTOPRAZOLE SODIUM 20 MG/1
TABLET, DELAYED RELEASE ORAL
Qty: 90 TABLET | Refills: 1 | Status: SHIPPED | OUTPATIENT
Start: 2021-02-06 | End: 2021-08-25 | Stop reason: SDUPTHER

## 2021-02-06 RX ORDER — SERTRALINE HYDROCHLORIDE 100 MG/1
200 TABLET, FILM COATED ORAL DAILY
Qty: 180 TABLET | Refills: 1 | Status: SHIPPED | OUTPATIENT
Start: 2021-02-06 | End: 2021-07-14

## 2021-03-29 ENCOUNTER — TELEPHONE (OUTPATIENT)
Dept: FAMILY MEDICINE CLINIC | Age: 46
End: 2021-03-29

## 2021-04-05 ENCOUNTER — OFFICE VISIT (OUTPATIENT)
Dept: FAMILY MEDICINE CLINIC | Age: 46
End: 2021-04-05
Payer: COMMERCIAL

## 2021-04-05 VITALS
SYSTOLIC BLOOD PRESSURE: 120 MMHG | DIASTOLIC BLOOD PRESSURE: 70 MMHG | WEIGHT: 177 LBS | TEMPERATURE: 97.1 F | HEART RATE: 100 BPM | OXYGEN SATURATION: 97 % | BODY MASS INDEX: 32.57 KG/M2 | HEIGHT: 62 IN

## 2021-04-05 DIAGNOSIS — I83.813 VARICOSE VEINS OF BILATERAL LOWER EXTREMITIES WITH PAIN: Primary | ICD-10-CM

## 2021-04-05 DIAGNOSIS — E11.9 TYPE 2 DIABETES MELLITUS WITHOUT COMPLICATION, WITHOUT LONG-TERM CURRENT USE OF INSULIN (HCC): ICD-10-CM

## 2021-04-05 PROCEDURE — 99213 OFFICE O/P EST LOW 20 MIN: CPT | Performed by: NURSE PRACTITIONER

## 2021-04-05 RX ORDER — EMPAGLIFLOZIN 10 MG/1
TABLET, FILM COATED ORAL
Qty: 90 TABLET | Refills: 1 | Status: SHIPPED | OUTPATIENT
Start: 2021-04-05 | End: 2021-10-15 | Stop reason: SDUPTHER

## 2021-04-05 ASSESSMENT — ENCOUNTER SYMPTOMS
EYE REDNESS: 0
ABDOMINAL PAIN: 0
COUGH: 0
SHORTNESS OF BREATH: 0
EYE PAIN: 0
ABDOMINAL DISTENTION: 0
BACK PAIN: 1
CONSTIPATION: 0
COLOR CHANGE: 1
SORE THROAT: 0
TROUBLE SWALLOWING: 0
DIARRHEA: 0
RHINORRHEA: 0

## 2021-04-05 NOTE — PATIENT INSTRUCTIONS
awake) or until the swelling goes down. Put a thin cloth between the ice and your skin. · If you cut or scratch the skin over a vein, it may bleed a lot. Prop up your leg and apply firm pressure with a clean bandage over the site of the bleeding. Continue to apply pressure for a full 15 minutes. Do not check sooner to see if the bleeding has stopped. If the bleeding has not stopped after 15 minutes, apply pressure again for another 15 minutes. You can repeat this up to 3 times for a total of 45 minutes. If you have a blood clot in a varicose vein, you may have tenderness and swelling over the vein. The vein may feel firm. Be sure to call your doctor right away if you have these symptoms. If your doctor has told you how to care for the clot, follow his or her instructions. Care may include the following:  · Prop up your leg and apply heat with a warm, damp cloth or a heating pad set on low (put a towel or cloth between your leg and the heating pad to prevent burns). · Ask your doctor if you can take an over-the-counter pain medicine, such as acetaminophen (Tylenol), ibuprofen (Advil, Motrin), or naproxen (Aleve). Be safe with medicines. Read and follow all instructions on the label. When should you call for help? Call 911 anytime you think you may need emergency care. For example, call if:    · You have sudden chest pain and shortness of breath, or you cough up blood. Call your doctor now or seek immediate medical care if:    · You have signs of a blood clot, such as:  ? Pain in your calf, back of the knee, thigh, or groin. ? Redness and swelling in your leg or groin.     · A varicose vein begins to bleed and you cannot stop it.     · You have a tender lump in your leg.     · You get an open sore. Watch closely for changes in your health, and be sure to contact your doctor if:    · Your varicose vein symptoms do not improve with home treatment. Where can you learn more?   Go to [email or web address] and sign in to your [employer] account. Enter W360 in the ClinicalBoxhire box to learn more about \"Varicose Veins: Care Instructions. \"     If you do not have an account, please click on the \"Sign Up Now\" link. Current as of: March 4, 2020               Content Version: 12.8  © 8344-5201 [employer] [facility]. Care instructions adapted under license by [facility] ([facility]). If you have questions about a medical condition or this instruction, always ask your healthcare professional. [place] [address] any warranty or liability for your use of this information.

## 2021-04-05 NOTE — PROGRESS NOTES
4/5/2021    This is a 39 y.o. female who presents for  Chief Complaint   Patient presents with    Varicose Veins     right leg, traveling thursday        Varicose veins 5 years ago- patient saw a vascular surgeon at that time but was not symptomatic. Patient began having discomfort 6 months ago. She needs a refill on Jardiance. Diabetes Mellitus Type 2: Here for refill. Denies any concerns related to DM. Has been taking medication regularly, including Jardiance, Victoza, metformin, pravastatin. Current symptoms/problems include none. Medication compliance:  compliant most of the time  Diabetic diet compliance:  compliant most of the time,  Weight trend: stable  Eye exam current (within one year): yes   reports that she has been smoking. She has a 18.00 pack-year smoking history. She has never used smokeless tobacco.   Daily Aspirin?  No  Known diabetic complications: none           Past Medical History:   Diagnosis Date    Allergic rhinitis     Anxiety 1997    Asthma 1978    persisitent    GERD (gastroesophageal reflux disease) 1985    Headache(784.0) 1993    Hyperlipidemia 3/11/2014    Obesity     Seizures ([facility] [address] 2007    one seizure    Type II or unspecified type diabetes mellitus without mention of complication, not stated as uncontrolled 2012       Past Surgical History:   Procedure Laterality Date    WISDOM TOOTH EXTRACTION      2 removed       Social History     Socioeconomic History    Marital status:      Spouse name: Not on file    Number of children: Not on file    Years of education: Not on file    Highest education level: Not on file   Occupational History    Occupation: [employer]   Social Needs    Financial resource strain: Not on file    Food insecurity     Worry: Not on file     Inability: Not on file    Transportation needs     Medical: Not on file     Non-medical: Not on file   Tobacco Use    Smoking status: Current Every Day Smoker     Packs/day: 1.00     Years: 18.00 Pack years: 18.00     Last attempt to quit: 1/10/2013     Years since quittin.2    Smokeless tobacco: Never Used    Tobacco comment: 2013 quit   Substance and Sexual Activity    Alcohol use: Yes     Comment: 1 drink a month    Drug use: No     Comment: 13    Sexual activity: Yes   Lifestyle    Physical activity     Days per week: Not on file     Minutes per session: Not on file    Stress: Not on file   Relationships    Social connections     Talks on phone: Not on file     Gets together: Not on file     Attends Voodoo service: Not on file     Active member of club or organization: Not on file     Attends meetings of clubs or organizations: Not on file     Relationship status: Not on file    Intimate partner violence     Fear of current or ex partner: Not on file     Emotionally abused: Not on file     Physically abused: Not on file     Forced sexual activity: Not on file   Other Topics Concern    Not on file   Social History Narrative    Not on file       Family History   Problem Relation Age of Onset    Arthritis Mother     Arthritis Father         OA    Diabetes Father     High Blood Pressure Father     High Cholesterol Father     Vision Loss Father     Heart Disease Father 61        MI. quad bypass    Allergies Sister         allergy induced asthma    Arthritis Maternal Grandmother         OA    Cancer Maternal Grandmother 80        colon    Heart Disease Maternal Grandmother     High Blood Pressure Maternal Grandmother     Kidney Disease Maternal Grandmother     Arthritis Maternal Grandfather     Cancer Maternal Grandfather         esophageal    Arthritis Paternal Grandmother     Diabetes Paternal Grandmother     Arthritis Paternal Grandfather     Cancer Paternal Grandfather 79        colon    Cancer Maternal Aunt 46        breast       Current Outpatient Medications   Medication Sig Dispense Refill    Liraglutide (VICTOZA) 18 MG/3ML SOPN SC injection Inject 0.3 mLs (1.8 mg total) subcutaneously daily. 27 mL 3    metFORMIN (GLUCOPHAGE) 1000 MG tablet Take 1 tablet (1,000 mg total) by mouth 2 times a day. 60 tablet 3    pantoprazole (PROTONIX) 20 MG tablet Take 1 tablet (20 mg total) by mouth daily. 90 tablet 1    sertraline (ZOLOFT) 100 MG tablet Take 2 tablets by mouth daily 180 tablet 1    empagliflozin (JARDIANCE) 10 MG tablet TAKE 1 TABLET BY MOUTH DAILY 90 tablet 1    busPIRone (BUSPAR) 15 MG tablet Take 1 tablet (15 mg total) by mouth 2 times a day. 180 tablet 1    pravastatin (PRAVACHOL) 40 MG tablet Take 1 tablet (40 mg total) by mouth daily. 90 tablet 3    SYMBICORT 160-4.5 MCG/ACT AERO Inhale 2 puffs into the lungs 2 times daily 1 Inhaler 5    albuterol sulfate HFA (VENTOLIN HFA) 108 (90 Base) MCG/ACT inhaler Inhale 2 puffs into the lungs every 6 hours as needed for wheezing. 1 Inhaler 5    Insulin Pen Needle (EASY TOUCH PEN NEEDLES) 31G X 5 MM MISC Inject 1 each into the skin daily 100 each 0    EASY TOUCH PEN NEEDLES 31G X 5 MM MISC USE ONE DAILY 100 each 3    Blood Glucose Monitoring Suppl (AGAMATRIX PRESTO) w/Device KIT 1 each by Does not apply route 3 times daily (before meals) 1 kit 0    glucose blood VI test strips (AGAMATRIX PRESTO TEST) strip 1 each by In Vitro route 3 times daily As needed. 300 each 3    AGAMATRIX ULTRA-THIN LANCETS MISC 1 each by Does not apply route 3 times daily (before meals) 300 each 3    Cholecalciferol (VITAMIN D3) 2000 UNITS CAPS Take by mouth      Multiple Vitamins-Minerals (THERAPEUTIC MULTIVITAMIN-MINERALS) tablet Take 1 tablet by mouth daily      B Complex-Folic Acid (B COMPLEX-VITAMIN B12) TABS Take  by mouth.  0    calcium carbonate (OSCAL) 500 MG TABS tablet Take 500 mg by mouth daily.  cetirizine (ZYRTEC) 10 MG tablet Take 10 mg by mouth daily. No current facility-administered medications for this visit.         Immunization History   Administered Date(s) Administered    COVID-19, Pfizer, PF, 30mcg/0.3mL 01/08/2021, 01/29/2021    Hepatitis A 10/08/2016, 07/31/2017, 07/31/2017    Hepatitis A Adult (Havrix, Vaqta) 10/08/2016    Hepatitis B 10/08/2016, 01/29/2017    Hepatitis B (Engerix-B) 10/08/2016, 01/29/2017    Influenza A ([id number]) Vaccine PF IM 11/16/2009    Influenza Vaccine, unspecified formulation 09/25/2013    Influenza Virus Vaccine 09/25/2013, 10/15/2014, 12/01/2016, 10/25/2017    Influenza Whole 10/15/2014    Influenza, Quadv, IM, (6 mo and older Fluzone, Flulaval, Fluarix and 3 yrs and older Afluria) 12/01/2016, 10/09/2018    Influenza, Quadv, IM, PF (6 mo and older Fluzone, Flulaval, Fluarix, and 3 yrs and older Afluria) 10/09/2018, 10/01/2020    Pneumococcal Polysaccharide ([id number]) 01/02/2014, 01/04/2021    Td (Adult), 5 Lf Tetanus Toxoid, Pf (Tenivac, Decavac) 01/29/2017    Tdap (Boostrix, Adacel) 08/06/2012    Typhoid Vaccine 01/29/2017    Typhoid Vi capsular polysaccharide (Typhim VI) 01/29/2017       Allergies   Allergen Reactions    Fish-Derived Products Anaphylaxis    Adhesive Tape      Other reaction(s): rash/blistering  12/28/2020 -  12/26/2019 -  04/12/2019 -      Amoxicillin Hives    Pcn [Penicillins] Hives       Review of Systems   Constitutional: Negative for activity change and appetite change. HENT: Negative for congestion, ear discharge, ear pain, postnasal drip, rhinorrhea, sore throat and trouble swallowing. Eyes: Negative for pain and redness. Respiratory: Negative for cough and shortness of breath. Cardiovascular: Negative for chest pain. Varicose veins BLE R>L   Gastrointestinal: Negative for abdominal distention, abdominal pain, constipation and diarrhea. Genitourinary: Negative for difficulty urinating and urgency. Musculoskeletal: Positive for back pain (hx of backpain and sciatica ). Negative for arthralgias and myalgias. Skin: Positive for color change (varicose veins noted in BLE R>L). Negative for rash.    Neurological: Negative for dizziness, light-headedness and headaches. Psychiatric/Behavioral: Negative for behavioral problems and sleep disturbance. The patient is not nervous/anxious. /70   Pulse 100   Temp 97.1 °F (36.2 °C)   Ht 5' 2\" (1.575 m)   Wt 177 lb (80.3 kg)   LMP 03/19/2021 (Exact Date)   SpO2 97%   BMI 32.37 kg/m²   Calculator: Revised venous clinical severity score in adults (2010)        Pain or discomfort of presumed venous origin        None (0 points)       Occasional, not restricting activity (1 point)       Daily, with partial interference of activity (2 points)       Daily, limiting most activities (3 points)   Varicose veins*        None (0 points)       Few, scattered branch varicositis or clusters, or corona phebectatic (1 point)       Multiple varicosities confined to calf or thigh (2 points)       Multiple varicosities involving calf and thigh (3 points)   Venous edema ¶        None (0 points)       Foot and or ankle only (1 point)       Above ankle but below knee (2 points)       Above knee (3 points)   Pigmentation? None or focal (0 points)       Perimalleolar area only (1 point)       Diffuse but limited to lower one-third of calf (2 points)       Wide distribution above lower one-third of calf (3 points)   Inflammation: Erythema related to cellulitis, dermatitis, or venous eczema        None (0 points)       Perimalleolar area only (1 point)       Diffuse but limited to lower one-third of calf (2 points)       Wide distribution above lower one-third of calf (3 points)   Induration        None (0 points)       Perimalleolar area only (1 point)       Diffuse but limited to lower one-third of calf (2 points)       Wide distribution above lower one-third of calf (3 points)   Number of active ulcers        None (0 points)       1 (1 point)       2 (2 points)       ? 3 (3 points)   Duration of active ulcers        N/A (0 points)       <3 months (1 point)       >3 months <1 year (2 points)       >1 year (3 points)   Size of active ulcers        N/A (0 points)       <2 cm diameter (1 point)       2 to 6 cm diameter (2 points)       >6 cm diameter (3 points)   Compression therapy        None (0 points)       Intermittent stocking use (1 point)       Stocking use most days (2 points)       Continuous stocking use (3 points)       Total criteria point count: 7      Physical Exam  Vitals signs reviewed. Constitutional:       Appearance: Normal appearance. HENT:      Head: Normocephalic. Nose: Nose normal.      Mouth/Throat:      Mouth: Mucous membranes are moist.   Eyes:      Extraocular Movements: Extraocular movements intact. Pupils: Pupils are equal, round, and reactive to light. Neck:      Musculoskeletal: Normal range of motion. Cardiovascular:      Rate and Rhythm: Normal rate and regular rhythm. Pulses: Normal pulses. Carotid pulses are 2+ on the right side and 2+ on the left side. Popliteal pulses are 2+ on the right side and 2+ on the left side. Dorsalis pedis pulses are 2+ on the right side and 2+ on the left side. Heart sounds: Normal heart sounds. Pulmonary:      Effort: Pulmonary effort is normal.      Breath sounds: Normal breath sounds. Abdominal:      General: Bowel sounds are normal.      Palpations: Abdomen is soft. Musculoskeletal: Normal range of motion. Right lower leg: No edema. Left lower leg: No edema. Skin:     General: Skin is warm. Capillary Refill: Capillary refill takes less than 2 seconds. Neurological:      General: No focal deficit present. Mental Status: She is alert and oriented to person, place, and time. Psychiatric:         Mood and Affect: Mood normal.         Behavior: Behavior normal.         Thought Content: Thought content normal.         Plan  1.  Varicose veins of bilateral lower extremities with pain  -Medical compression stockings  - Jazlyn Bangura MD, Vascular Surgery, [facility]  Use compression stockings as discussed, can use ace wrap if cannot find stockings that are appropriate length. Discussed visiting [facility] as they may have resources. Use Tylenol Arthritis at bedtime and one in the morning after breakfast to help with symptoms. Make an appointment with Vascular surgery as discussed. 2. Type 2 diabetes mellitus without complication, without long-term current use of insulin ([address]  This problem is well controlled. Pt should continue current medications (Jardiance, Victoza, metformin) at current dose. Will get the above labs for overall assessment and looking for any medication adverse effects, as well as sequelae from DM. Follow up in 6 months. - empagliflozin (JARDIANCE) 10 MG tablet; TAKE 1 TABLET BY MOUTH DAILY  Dispense: 90 tablet; Refill: 1        While assessing care for this patient, I have reviewed all pertinent lab work/imaging/ specialist notes and care in reference to those problems addressed above in detail. Appropriate medical decision making was based on this. Please note that portions of this note may have been completed with a voice recognition program. Efforts were made to edit the dictations but occasionally words are mis-transcribed. Return if symptoms worsen or fail to improve.

## 2021-04-21 RX ORDER — BUDESONIDE AND FORMOTEROL FUMARATE DIHYDRATE 160; 4.5 UG/1; UG/1
AEROSOL RESPIRATORY (INHALATION)
Qty: 1 INHALER | Refills: 5 | Status: SHIPPED | OUTPATIENT
Start: 2021-04-21 | End: 2021-08-25 | Stop reason: SDUPTHER

## 2021-04-21 RX ORDER — ALBUTEROL SULFATE 90 UG/1
AEROSOL, METERED RESPIRATORY (INHALATION)
Qty: 1 INHALER | Refills: 5 | Status: SHIPPED | OUTPATIENT
Start: 2021-04-21 | End: 2021-08-25 | Stop reason: SDUPTHER

## 2021-04-21 NOTE — TELEPHONE ENCOUNTER
.  Last office visit 4/5/2021     Last written 7- 1 with 5      Next office visit scheduled 7/12/2021    Requested Prescriptions     Pending Prescriptions Disp Refills    budesonide-formoterol (SYMBICORT) 160-4.5 MCG/ACT AERO 1 Inhaler 5     Sig: Inhale 2 puffs into the lungs 2 times daily    albuterol sulfate HFA (VENTOLIN HFA) 108 (90 Base) MCG/ACT inhaler 1 Inhaler 5     Sig: Inhale 2 puffs into the lungs every 6 hours as needed for wheezing.

## 2021-05-21 RX ORDER — EPINEPHRINE 0.3 MG/.3ML
INJECTION SUBCUTANEOUS
Qty: 2 EACH | Refills: 0 | Status: CANCELLED | OUTPATIENT
Start: 2021-05-21

## 2021-05-21 RX ORDER — EPINEPHRINE 0.3 MG/.3ML
INJECTION SUBCUTANEOUS
Qty: 2 EACH | Refills: 0 | Status: SHIPPED | OUTPATIENT
Start: 2021-05-21

## 2021-05-21 NOTE — TELEPHONE ENCOUNTER
Pt. Going to [place] tomorrow and has a severe allergy to seafood. Is asking for her Auvi-Q. She use to get EpiPen but this is more affordable. [employer]

## 2021-06-21 RX ORDER — BUSPIRONE HYDROCHLORIDE 15 MG/1
TABLET ORAL
Qty: 180 TABLET | Refills: 1 | Status: SHIPPED | OUTPATIENT
Start: 2021-06-21 | End: 2021-12-14 | Stop reason: SDUPTHER

## 2021-06-21 NOTE — TELEPHONE ENCOUNTER
.  Last office visit 4/5/2021     Last written 12-4-2020 180 with 1      Next office visit scheduled 7/12/2021    Requested Prescriptions     Pending Prescriptions Disp Refills    busPIRone (BUSPAR) 15 MG tablet 180 tablet 1     Sig: Take 1 tab by mouth 2 times a day

## 2021-07-12 ENCOUNTER — OFFICE VISIT (OUTPATIENT)
Dept: FAMILY MEDICINE CLINIC | Age: 46
End: 2021-07-12
Payer: COMMERCIAL

## 2021-07-12 VITALS
WEIGHT: 174.2 LBS | DIASTOLIC BLOOD PRESSURE: 78 MMHG | HEART RATE: 93 BPM | SYSTOLIC BLOOD PRESSURE: 110 MMHG | OXYGEN SATURATION: 96 % | BODY MASS INDEX: 31.86 KG/M2

## 2021-07-12 DIAGNOSIS — N62 MACROMASTIA: ICD-10-CM

## 2021-07-12 DIAGNOSIS — E11.65 UNCONTROLLED TYPE 2 DIABETES MELLITUS WITH HYPERGLYCEMIA (HCC): Primary | ICD-10-CM

## 2021-07-12 LAB — HBA1C MFR BLD: 7.3 %

## 2021-07-12 PROCEDURE — 83036 HEMOGLOBIN GLYCOSYLATED A1C: CPT | Performed by: FAMILY MEDICINE

## 2021-07-12 PROCEDURE — 3051F HG A1C>EQUAL 7.0%<8.0%: CPT | Performed by: FAMILY MEDICINE

## 2021-07-12 PROCEDURE — 99213 OFFICE O/P EST LOW 20 MIN: CPT | Performed by: FAMILY MEDICINE

## 2021-07-12 RX ORDER — ZINC GLUCONATE 50 MG
50 TABLET ORAL DAILY
COMMUNITY

## 2021-07-13 NOTE — PROGRESS NOTES
Charbel Wilkinson (:  1975) is a 39 y.o. female,Established patient, here for evaluation of the following chief complaint(s):  6 Month Follow-Up and Diabetes         ASSESSMENT/PLAN:  1. Uncontrolled type 2 diabetes mellitus with hyperglycemia ([address]  Counseling at today's visit: focused on the need for regular aerobic exercise and discussed the advantages of a diet low in carbohydrates. Discussed finding low carb diet that fits her dietary preferences. Specific fat requirements of strict keto diet not required, but it is important to have healthy fats in the diet (olive oil, avocado, nuts, seeds). Decreased processed foods, sugar, flour. Increase non-starchy vegetables. Do not eat when not hungry, avoid eating on a schedule if not hungry. Avoid snacking when possible. Continue Jardiance, metformin, and Victoza. Discussed changing GLP-1 to once weekly, which she would like to try, but just got refills of Victoza, so would like to wait until her supply is lower on that before sending in new Rx.     2. Macromastia  -     External Referral To Plastic Surgery      Return in about 3 months (around 10/12/2021) for Diabetes. Subjective   SUBJECTIVE/OBJECTIVE:  HPI   Patient here for DM follow up. Has been more active outside lately. Has not been following an eating plan lately. Has done keto a few years ago, but felt it was too much fat and was not sustainable. Weight is down 3 lbs since her last visit. She is interested in getting a breast reduction. She would like a referral. Needs to stay within the  system for insurance reasons. Review of Systems       Objective   Physical Exam  Vitals and nursing note reviewed. Constitutional:       Appearance: Normal appearance. Neurological:      Mental Status: She is alert.             Results for POC orders placed in visit on 21   POCT glycosylated hemoglobin (Hb A1C)   Result Value Ref Range    Hemoglobin A1C 7.3 %           An electronic signature was used to authenticate this note.     --Rox Nails MD

## 2021-07-14 RX ORDER — SERTRALINE HYDROCHLORIDE 100 MG/1
TABLET, FILM COATED ORAL
Qty: 180 TABLET | Refills: 1 | Status: SHIPPED | OUTPATIENT
Start: 2021-07-14 | End: 2022-01-20

## 2021-08-11 ENCOUNTER — OFFICE VISIT (OUTPATIENT)
Dept: FAMILY MEDICINE CLINIC | Age: 46
End: 2021-08-11
Payer: COMMERCIAL

## 2021-08-11 VITALS
DIASTOLIC BLOOD PRESSURE: 74 MMHG | HEART RATE: 97 BPM | SYSTOLIC BLOOD PRESSURE: 114 MMHG | BODY MASS INDEX: 31.42 KG/M2 | WEIGHT: 171.8 LBS | OXYGEN SATURATION: 96 %

## 2021-08-11 DIAGNOSIS — L08.9 WOUND INFECTION: Primary | ICD-10-CM

## 2021-08-11 DIAGNOSIS — T14.8XXA WOUND INFECTION: Primary | ICD-10-CM

## 2021-08-11 PROCEDURE — 99213 OFFICE O/P EST LOW 20 MIN: CPT | Performed by: NURSE PRACTITIONER

## 2021-08-11 RX ORDER — CLINDAMYCIN HYDROCHLORIDE 300 MG/1
300 CAPSULE ORAL 2 TIMES DAILY
Qty: 14 CAPSULE | Refills: 0 | Status: SHIPPED | OUTPATIENT
Start: 2021-08-11 | End: 2021-08-18

## 2021-08-11 ASSESSMENT — ENCOUNTER SYMPTOMS: COLOR CHANGE: 0

## 2021-08-11 NOTE — PROGRESS NOTES
2021     Mary Lou Young (:  1975) is a 39 y.o. female, here for evaluation of the following medical concerns:    HPI  Pt states that she noticed a blister to her right breast a week ago and accidentally popped it. She is concerned that it is now becoming infected. She is not sure how she got the blister, denies any known injury. She has been keeping the area clean and dry and has been applying OTC antibiotic ointment. Last mammogram done 2021 - no mammographic evidence of malignancy. Review of Systems   Constitutional: Negative. Skin: Positive for wound. Negative for color change, pallor and rash. Prior to Visit Medications    Medication Sig Taking? Authorizing Provider   clindamycin (CLEOCIN) 300 MG capsule Take 1 capsule by mouth 2 times daily for 7 days Yes MARIANNA Leonard CNP   pravastatin (PRAVACHOL) 40 MG tablet Take 1 tablet (40 mg total) by mouth daily. Yes MARIANNA Cardenas CNP   metFORMIN (GLUCOPHAGE) 1000 MG tablet Take 1 tablet (1,000 mg total) by mouth 2 times a day. Yes MARIANNA Cardenas CNP   sertraline (ZOLOFT) 100 MG tablet Take 2 tablets (200 mg total) by mouth daily Yes MARIANNA Cardenas CNP   zinc gluconate 50 MG tablet Take 50 mg by mouth daily Yes Historical Provider, MD   busPIRone (BUSPAR) 15 MG tablet Take 1 tab by mouth 2 times a day Yes Rickey Tejeda MD   EPINEPHrine (AUVI-Q) 0.3 MG/0.3ML SOAJ injection Use as directed for allergic reaction Yes MARIANNA Montana CNP   budesonide-formoterol (SYMBICORT) 160-4.5 MCG/ACT AERO Inhale 2 puffs into the lungs 2 times daily Yes Rickey Tejeda MD   albuterol sulfate HFA (VENTOLIN HFA) 108 (90 Base) MCG/ACT inhaler Inhale 2 puffs into the lungs every 6 hours as needed for wheezing.  Yes Rickey Tejeda MD   empagliflozin (JARDIANCE) 10 MG tablet TAKE 1 TABLET BY MOUTH DAILY Yes MARIANNA Montana CNP   Liraglutide (VICTOZA) 18 MG/3ML SOPN SC injection Inject 0.3 mLs (1.8 mg total) 96%   Weight: 171 lb 12.8 oz (77.9 kg)     Estimated body mass index is 31.42 kg/m² as calculated from the following:    Height as of 4/5/21: 5' 2\" (1.575 m). Weight as of this encounter: 171 lb 12.8 oz (77.9 kg). Physical Exam  Vitals reviewed. Constitutional:       Appearance: Normal appearance. HENT:      Head: Normocephalic. Pulmonary:      Effort: Pulmonary effort is normal.   Skin:     General: Skin is warm and dry. Findings: Wound present. Comments: Right breast.  See picture. Neurological:      General: No focal deficit present. Mental Status: She is alert and oriented to person, place, and time. Psychiatric:         Mood and Affect: Mood normal.         Behavior: Behavior normal.         Thought Content: Thought content normal.         Judgment: Judgment normal.       Media Information     Document Information    Wound      08/11/2021 16:35   Attached To: Office Visit on 8/11/21 with MARIANNA Hartley [address] [place] [address] APRN - CNP  Formerly [facility]         ASSESSMENT/PLAN:  1. Wound infection  Blister possibly d/t wearing an improperly fitting bra. Pt is allergic to PCN. She states that she has taken clindamycin in the past without difficulty. Advised pt to take antibiotic as prescribed and keep the wound KERI. Monitor for increased s/s of infection and f/u if symptoms do not improve. - clindamycin (CLEOCIN) 300 MG capsule; Take 1 capsule by mouth 2 times daily for 7 days  Dispense: 14 capsule; Refill: 0      Return if symptoms worsen or fail to improve. An electronic signature was used to authenticate this note.     --MARIANNA Hartley CNP on 8/11/2021 at 4:40 PM

## 2021-08-25 RX ORDER — BUDESONIDE AND FORMOTEROL FUMARATE DIHYDRATE 160; 4.5 UG/1; UG/1
AEROSOL RESPIRATORY (INHALATION)
Qty: 3 INHALER | Refills: 2 | Status: SHIPPED | OUTPATIENT
Start: 2021-08-25

## 2021-08-25 RX ORDER — ALBUTEROL SULFATE 90 UG/1
AEROSOL, METERED RESPIRATORY (INHALATION)
Qty: 1 INHALER | Refills: 5 | Status: SHIPPED | OUTPATIENT
Start: 2021-08-25 | End: 2022-08-09 | Stop reason: SDUPTHER

## 2021-08-25 RX ORDER — PANTOPRAZOLE SODIUM 20 MG/1
TABLET, DELAYED RELEASE ORAL
Qty: 90 TABLET | Refills: 1 | Status: SHIPPED | OUTPATIENT
Start: 2021-08-25 | End: 2022-01-20

## 2021-08-25 NOTE — TELEPHONE ENCOUNTER
Refill Request     Last Seen: Last Seen Department: 8/11/2021  Last Seen by PCP: 7/12/2021    Last Written: 2/6/21 90 tablet 1 refill                          4/21/21 1 inhaler 5 refill                          4/21/21 1 inhaler 5 refill    Next Appointment:  10/25/21     Future Appointments   Date Time Provider Adrianne Yoselin   10/25/2021 10:45 AM MD [facility] [place] [facility]       Future appointment scheduled      Requested Prescriptions     Pending Prescriptions Disp Refills    pantoprazole (PROTONIX) 20 MG tablet 90 tablet 1     Sig: Take 1 tablet (20 mg total) by mouth daily.  budesonide-formoterol (SYMBICORT) 160-4.5 MCG/ACT AERO 1 Inhaler 5     Sig: Inhale 2 puffs into the lungs 2 times daily    albuterol sulfate HFA (VENTOLIN HFA) 108 (90 Base) MCG/ACT inhaler 1 Inhaler 5     Sig: Inhale 2 puffs into the lungs every 6 hours as needed for wheezing.

## 2021-10-05 ENCOUNTER — PATIENT MESSAGE (OUTPATIENT)
Dept: FAMILY MEDICINE CLINIC | Age: 46
End: 2021-10-05

## 2021-10-06 RX ORDER — LIRAGLUTIDE 6 MG/ML
INJECTION SUBCUTANEOUS
Qty: 27 ML | Refills: 3 | Status: SHIPPED | OUTPATIENT
Start: 2021-10-06 | End: 2021-12-14 | Stop reason: SDUPTHER

## 2021-10-15 DIAGNOSIS — E11.9 TYPE 2 DIABETES MELLITUS WITHOUT COMPLICATION, WITHOUT LONG-TERM CURRENT USE OF INSULIN (HCC): ICD-10-CM

## 2021-10-15 RX ORDER — EMPAGLIFLOZIN 10 MG/1
TABLET, FILM COATED ORAL
Qty: 90 TABLET | Refills: 1 | Status: SHIPPED | OUTPATIENT
Start: 2021-10-15 | End: 2021-11-02 | Stop reason: SDUPTHER

## 2021-10-15 NOTE — TELEPHONE ENCOUNTER
Refill Request     Last Seen: Last Seen Department: 8/11/2021  Last Seen by PCP: 7/12/2021    Last Written: 7/14/21 with  3 refills    Next Appointment:   Future Appointments   Date Time Provider Adrianne Wyatt   10/25/2021 10:45 AM MD [facility] Mccain  [place] [facility]       Future appointment scheduled      Requested Prescriptions     Pending Prescriptions Disp Refills    metFORMIN (GLUCOPHAGE) 1000 MG tablet [Pharmacy Med Name: metFORMIN 1,000 mg tablet (GLUCOPHAGE)] 60 tablet 3     Sig: Take 1 tablet (1,000 mg total) by mouth 2 times a day.

## 2021-10-15 NOTE — TELEPHONE ENCOUNTER
Last office visit 8/11/2021     Last written 4-5-2021 x 1 refill    Next office visit scheduled 10/25/2021    Requested Prescriptions     Pending Prescriptions Disp Refills    empagliflozin (JARDIANCE) 10 MG tablet 90 tablet 1     Sig: TAKE 1 TABLET BY MOUTH DAILY

## 2021-11-04 ENCOUNTER — PATIENT MESSAGE (OUTPATIENT)
Dept: FAMILY MEDICINE CLINIC | Age: 46
End: 2021-11-04

## 2021-11-04 DIAGNOSIS — E11.9 TYPE 2 DIABETES MELLITUS WITHOUT COMPLICATION, WITHOUT LONG-TERM CURRENT USE OF INSULIN (HCC): ICD-10-CM

## 2021-11-04 NOTE — TELEPHONE ENCOUNTER
Refill Request     Last Seen: Last Seen Department: 8/11/2021  Last Seen by PCP: 7/12/2021    Last Written: 11/2/21 90 tablet 0 refill     Next Appointment: 1/3/22  Future Appointments   Date Time Provider Adrianne Wyatt   1/3/2022  9:30 AM MD IS[facility] [place]  [place] [facility]       Future appointment scheduled      Requested Prescriptions     Pending Prescriptions Disp Refills    empagliflozin (JARDIANCE) 10 MG tablet 90 tablet 0     Sig: TAKE 1 TABLET BY MOUTH DAILY

## 2021-11-04 NOTE — TELEPHONE ENCOUNTER
Refill Request     Last Seen: Last Seen Department: 2021  Last Seen by PCP: 2021    Last Written: 2017    Next Appointment:   Future Appointments   Date Time Provider Adrianne Wyatt   1/3/2022  9:30 AM MD IS[facility] Zavala  [place] [facility]       Future appointment scheduled      Requested Prescriptions     Pending Prescriptions Disp Refills    Insulin Pen Needle (EASY TOUCH PEN NEEDLES) 31G X 5 MM MISC 100 each 0     Sig: Inject 1 each into the skin daily    blood glucose test strips (AGAMATRIX PRESTO TEST) strip 300 each 3     Si each by In Vitro route 3 times daily As needed.     AgaMatrix Ultra-Thin Lancets MISC 300 each 3     Si each by Does not apply route 3 times daily (before meals)

## 2021-11-04 NOTE — TELEPHONE ENCOUNTER
From: Blanca Channel  To: Faustino Syed MD  Sent: 11/4/2021 11:03 AM EDT  Subject: Prescription Question    Can I please get a refill sent to the [facility] for Insulin Pen Needle 31G X 5 MM Misc, blood glucose test strips and lancets? Thanks!

## 2021-11-05 RX ORDER — BLOOD SUGAR DIAGNOSTIC
1 STRIP MISCELLANEOUS 3 TIMES DAILY
Qty: 300 EACH | Refills: 3 | Status: SHIPPED | OUTPATIENT
Start: 2021-11-05

## 2021-11-05 RX ORDER — EMPAGLIFLOZIN 10 MG/1
TABLET, FILM COATED ORAL
Qty: 90 TABLET | Refills: 0 | Status: SHIPPED | OUTPATIENT
Start: 2021-11-05 | End: 2022-03-30

## 2021-11-05 RX ORDER — LANCETS 33 GAUGE
1 EACH MISCELLANEOUS
Qty: 300 EACH | Refills: 3 | Status: SHIPPED | OUTPATIENT
Start: 2021-11-05

## 2021-11-05 RX ORDER — PEN NEEDLE, DIABETIC 31 GX3/16"
1 NEEDLE, DISPOSABLE MISCELLANEOUS DAILY
Qty: 100 EACH | Refills: 0 | Status: SHIPPED | OUTPATIENT
Start: 2021-11-05

## 2021-12-14 NOTE — TELEPHONE ENCOUNTER
Refill Request     Last Seen: Last Seen Department: 8/11/2021  Last Seen by PCP: Visit date not found    Last Written: 10/6/21 27 mL 3 refill     Next Appointment: 1/3/22     Future Appointments   Date Time Provider Adrianne Wyatt   1/3/2022  9:30 AM Duane Cancel, MD [facility]  [place] [facility]       Future appointment scheduled      Requested Prescriptions     Pending Prescriptions Disp Refills    Liraglutide (VICTOZA) 18 MG/3ML SOPN SC injection 27 mL 3     Sig: Inject 0.3 mLs (1.8 mg total) subcutaneously daily.

## 2021-12-14 NOTE — TELEPHONE ENCOUNTER
Refill Request     Last Seen: Last Seen Department: 8/11/2021  Last Seen by PCP: 7/12/2021    Last Written: 6/21/21 180 tablet 1 refill     Next Appointment: 1/3/22     Future Appointments   Date Time Provider Adrianne Wyatt   1[facility]2022  9:30 [facility]  [place] [facility]       Future appointment scheduled      Requested Prescriptions     Pending Prescriptions Disp Refills    busPIRone (BUSPAR) 15 MG tablet 180 tablet 1     Sig: Take 1 tab by mouth 2 times a day

## 2021-12-15 RX ORDER — BUSPIRONE HYDROCHLORIDE 15 MG/1
TABLET ORAL
Qty: 180 TABLET | Refills: 1 | Status: SHIPPED | OUTPATIENT
Start: 2021-12-15 | End: 2022-06-28

## 2021-12-15 RX ORDER — LIRAGLUTIDE 6 MG/ML
INJECTION SUBCUTANEOUS
Qty: 27 ML | Refills: 3 | Status: SHIPPED | OUTPATIENT
Start: 2021-12-15

## 2022-01-03 ENCOUNTER — OFFICE VISIT (OUTPATIENT)
Dept: FAMILY MEDICINE CLINIC | Age: 47
End: 2022-01-03
Payer: COMMERCIAL

## 2022-01-03 VITALS
SYSTOLIC BLOOD PRESSURE: 104 MMHG | OXYGEN SATURATION: 97 % | WEIGHT: 172 LBS | HEIGHT: 61 IN | TEMPERATURE: 98.3 F | BODY MASS INDEX: 32.47 KG/M2 | HEART RATE: 88 BPM | DIASTOLIC BLOOD PRESSURE: 70 MMHG

## 2022-01-03 DIAGNOSIS — Z00.00 ROUTINE GENERAL MEDICAL EXAMINATION AT A HEALTH CARE FACILITY: ICD-10-CM

## 2022-01-03 DIAGNOSIS — E11.9 TYPE 2 DIABETES MELLITUS WITHOUT COMPLICATION, WITHOUT LONG-TERM CURRENT USE OF INSULIN (HCC): Primary | ICD-10-CM

## 2022-01-03 DIAGNOSIS — Z12.11 COLON CANCER SCREENING: ICD-10-CM

## 2022-01-03 LAB — HBA1C MFR BLD: 6.6 %

## 2022-01-03 PROCEDURE — 83036 HEMOGLOBIN GLYCOSYLATED A1C: CPT | Performed by: FAMILY MEDICINE

## 2022-01-03 PROCEDURE — 99213 OFFICE O/P EST LOW 20 MIN: CPT | Performed by: FAMILY MEDICINE

## 2022-01-03 SDOH — ECONOMIC STABILITY: HOUSING INSECURITY
IN THE LAST 12 MONTHS, WAS THERE A TIME WHEN YOU DID NOT HAVE A STEADY PLACE TO SLEEP OR SLEPT IN A SHELTER (INCLUDING NOW)?: NO

## 2022-01-03 SDOH — ECONOMIC STABILITY: HOUSING INSECURITY: IN THE LAST 12 MONTHS, HOW MANY PLACES HAVE YOU LIVED?: 1

## 2022-01-03 SDOH — ECONOMIC STABILITY: FOOD INSECURITY: WITHIN THE PAST 12 MONTHS, THE FOOD YOU BOUGHT JUST DIDN'T LAST AND YOU DIDN'T HAVE MONEY TO GET MORE.: NEVER TRUE

## 2022-01-03 SDOH — ECONOMIC STABILITY: INCOME INSECURITY: IN THE LAST 12 MONTHS, WAS THERE A TIME WHEN YOU WERE NOT ABLE TO PAY THE MORTGAGE OR RENT ON TIME?: NO

## 2022-01-03 SDOH — ECONOMIC STABILITY: FOOD INSECURITY: WITHIN THE PAST 12 MONTHS, YOU WORRIED THAT YOUR FOOD WOULD RUN OUT BEFORE YOU GOT MONEY TO BUY MORE.: NEVER TRUE

## 2022-01-03 SDOH — ECONOMIC STABILITY: TRANSPORTATION INSECURITY
IN THE PAST 12 MONTHS, HAS THE LACK OF TRANSPORTATION KEPT YOU FROM MEDICAL APPOINTMENTS OR FROM GETTING MEDICATIONS?: NO

## 2022-01-03 SDOH — ECONOMIC STABILITY: TRANSPORTATION INSECURITY
IN THE PAST 12 MONTHS, HAS LACK OF TRANSPORTATION KEPT YOU FROM MEETINGS, WORK, OR FROM GETTING THINGS NEEDED FOR DAILY LIVING?: NO

## 2022-01-03 ASSESSMENT — SOCIAL DETERMINANTS OF HEALTH (SDOH): HOW HARD IS IT FOR YOU TO PAY FOR THE VERY BASICS LIKE FOOD, HOUSING, MEDICAL CARE, AND HEATING?: NOT HARD AT ALL

## 2022-01-04 NOTE — PROGRESS NOTES
Jude Omalley (:  1975) is a 55 y.o. female,Established patient, here for evaluation of the following chief complaint(s):  Diabetes         ASSESSMENT/PLAN:  1. Type 2 diabetes mellitus without complication, without long-term current use of insulin (HCC)  -     POCT glycosylated hemoglobin (Hb A1C)  -     MICROALBUMIN / CREATININE URINE RATIO; Future  -     Vitamin B12; Future  -     HM DIABETES FOOT EXAM  This problem is well controlled. Pt should continue current medication at current dose. Discussed any improvement in diet and exercise should further improve the blood glucose. 2. Routine general medical examination at a health care facility  -     MICROALBUMIN / CREATININE URINE RATIO; Future  -     Comprehensive Metabolic Panel; Future  -     Lipid Panel; Future  -     Vitamin B12; Future  -     CBC Auto Differential; Future  -     HEPATITIS C ANTIBODY; Future  3. Colon cancer screening  -     External Referral To Gastroenterology      Return in about 6 months (around 7/3/2022) for Annual physical, Diabetes. Subjective   SUBJECTIVE/OBJECTIVE:  Chief Complaint   Patient presents with    Diabetes       HPI Jude Omalley is a 55 y.o.  female here for follow up on type 2 diabetes. She does not feel she has done well in the last few months. She has had a lot of stressors, including the loss of her father. She has not been as focused on caring for herself. For insurance reasons, needs to do all labs through . Referrals for testing also need to be through . Employer has been updated in her chart. Review of Systems       Objective   Physical Exam  Vitals and nursing note reviewed. Constitutional:       General: She is not in acute distress. Appearance: She is well-developed. She is not diaphoretic. Cardiovascular:      Pulses:           Dorsalis pedis pulses are 2+ on the right side and 2+ on the left side.         Posterior tibial pulses are 2+ on the right side and 2+ on the left side. Pulmonary:      Effort: Pulmonary effort is normal.   Musculoskeletal:      Right foot: Normal range of motion. No deformity. Left foot: Normal range of motion. No deformity. Feet:      Right foot:      Protective Sensation: 10 sites tested. 10 sites sensed. Skin integrity: No ulcer, blister, skin breakdown, erythema, warmth, callus or dry skin. Left foot:      Protective Sensation: 10 sites tested. 10 sites sensed. Skin integrity: No ulcer, blister, skin breakdown, erythema, warmth, callus or dry skin. Neurological:      General: No focal deficit present. Mental Status: She is alert. Sensory: No sensory deficit. Deep Tendon Reflexes:      Reflex Scores:       Achilles reflexes are 2+ on the right side and 2+ on the left side. Comments: Normal proprioception of big toes bilaterally   Psychiatric:         Behavior: Behavior normal.            Results for POC orders placed in visit on 01/03/22   POCT glycosylated hemoglobin (Hb A1C)   Result Value Ref Range    Hemoglobin A1C 6.6 %           An electronic signature was used to authenticate this note.     --Jahaira Boudreaux MD

## 2022-01-05 NOTE — TELEPHONE ENCOUNTER
Refill Request     Last Seen: Last Seen Department: 1/3/2022  Last Seen by PCP: 1/3/2022    Last Written: 10/15/21 180 tablet 1 refill     Next Appointment: 1/5/22     Future Appointments   Date Time Provider Adrianne Wyatt   7/5/2022  9:00 AM [facility]  [place] [facility]       Future appointment scheduled      Requested Prescriptions     Pending Prescriptions Disp Refills    metFORMIN (GLUCOPHAGE) 1000 MG tablet 180 tablet 1

## 2022-01-06 ENCOUNTER — TELEPHONE (OUTPATIENT)
Dept: FAMILY MEDICINE CLINIC | Age: 47
End: 2022-01-06

## 2022-01-11 ENCOUNTER — E-VISIT (OUTPATIENT)
Dept: OTHER | Facility: CLINIC | Age: 47
End: 2022-01-11
Payer: COMMERCIAL

## 2022-01-11 DIAGNOSIS — J01.90 ACUTE NON-RECURRENT SINUSITIS, UNSPECIFIED LOCATION: Primary | ICD-10-CM

## 2022-01-11 DIAGNOSIS — B37.31 VAGINAL YEAST INFECTION: ICD-10-CM

## 2022-01-11 PROCEDURE — 99422 OL DIG E/M SVC 11-20 MIN: CPT | Performed by: FAMILY MEDICINE

## 2022-01-11 RX ORDER — DOXYCYCLINE HYCLATE 100 MG
100 TABLET ORAL 2 TIMES DAILY
Qty: 14 TABLET | Refills: 0 | Status: SHIPPED | OUTPATIENT
Start: 2022-01-11 | End: 2022-01-18

## 2022-01-11 RX ORDER — FLUCONAZOLE 150 MG/1
150 TABLET ORAL ONCE
Qty: 1 TABLET | Refills: 0 | Status: SHIPPED | OUTPATIENT
Start: 2022-01-11 | End: 2022-05-27 | Stop reason: SDUPTHER

## 2022-01-11 ASSESSMENT — LIFESTYLE VARIABLES
SMOKING_STATUS: YES
SMOKING_YEARS: 20

## 2022-01-11 NOTE — PROGRESS NOTES
HPI: as per patient provided history  Exam: N/A (electronic visit)  ASSESSMENT/PLAN:  1. Acute non-recurrent sinusitis, unspecified location  - doxycycline hyclate (VIBRA-TABS) 100 MG tablet; Take 1 tablet by mouth 2 times daily for 7 days  Dispense: 14 tablet; Refill: 0    2. Vaginal yeast infection  - fluconazole (DIFLUCAN) 150 MG tablet; Take 1 tablet by mouth once for 1 dose  Dispense: 1 tablet; Refill: 0      Patient instructed to call the office if worsens, or fails to improve as anticipated. 11-20 minutes were spent on the digital evaluation and management of this patient. Addendum made after further communication with the patient and ordering additional medication.

## 2022-01-20 RX ORDER — SERTRALINE HYDROCHLORIDE 100 MG/1
TABLET, FILM COATED ORAL
Qty: 180 TABLET | Refills: 1 | Status: SHIPPED | OUTPATIENT
Start: 2022-01-20 | End: 2022-06-28

## 2022-01-20 NOTE — TELEPHONE ENCOUNTER
Refill Request     Last Seen: Last Seen Department: 1/3/2022  Last Seen by PCP: 1/3/2022    Last Written: 7/14/2021    Next Appointment:   Future Appointments   Date Time Provider Adrianne Yoselin   7/5/2022  9:00 AM MD ISH Iverson  [place] [facility]       Future appointment scheduled      Requested Prescriptions     Pending Prescriptions Disp Refills    sertraline (ZOLOFT) 100 MG tablet [Pharmacy Med Name: sertraline 100 mg tablet (ZOLOFT)] 180 tablet 1     Sig: Take 2 tablets (200 mg total) by mouth daily.

## 2022-02-07 LAB — MAMMOGRAPHY, EXTERNAL: NORMAL

## 2022-03-03 ENCOUNTER — TELEPHONE (OUTPATIENT)
Dept: FAMILY MEDICINE CLINIC | Age: 47
End: 2022-03-03

## 2022-03-03 RX ORDER — TRAZODONE HYDROCHLORIDE 50 MG/1
50-100 TABLET ORAL NIGHTLY PRN
Qty: 60 TABLET | Refills: 0 | Status: SHIPPED | OUTPATIENT
Start: 2022-03-03 | End: 2022-07-11

## 2022-03-03 NOTE — TELEPHONE ENCOUNTER
I sent in trazodone for her to try. I'm very sorry to hear this. I'm sorry for her loss. Dr. Rosalina Gamino is also available a resource should she desire talking with a psychologist at any point.

## 2022-03-03 NOTE — TELEPHONE ENCOUNTER
Patient called stating her boyfriend was killed in the multicolor accident yesterday 3/2. She is having a lot of trouble sleeping, wants to know if she can get something to help with this.

## 2022-03-30 DIAGNOSIS — E11.9 TYPE 2 DIABETES MELLITUS WITHOUT COMPLICATION, WITHOUT LONG-TERM CURRENT USE OF INSULIN (HCC): ICD-10-CM

## 2022-03-30 RX ORDER — EMPAGLIFLOZIN 10 MG/1
TABLET, FILM COATED ORAL
Qty: 90 TABLET | Refills: 1 | Status: SHIPPED | OUTPATIENT
Start: 2022-03-30 | End: 2022-09-26 | Stop reason: SDUPTHER

## 2022-03-30 NOTE — TELEPHONE ENCOUNTER
Refill Request     Last Seen: Last Seen Department: 1/3/2022  Last Seen by PCP: 1/3/22     Last Written: 11/5/21 90 tablet 0 refill    Next Appointment: 7/11/22     Future Appointments   Date Time Provider Adrianne Wyatt   7/11/2022  8:30 AM MD ISH Corrigan  [place] [facility]       Future appointment scheduled      Requested Prescriptions     Pending Prescriptions Disp Refills    empagliflozin (Verneda Banister) 10 MG tablet [Pharmacy Med Name: Jardiance 10 mg tablet (empagliflozin)] 90 tablet 1     Sig: Take 1 tablet (10 mg total) by mouth daily.

## 2022-05-23 ENCOUNTER — NURSE TRIAGE (OUTPATIENT)
Dept: OTHER | Facility: CLINIC | Age: 47
End: 2022-05-23

## 2022-05-23 ENCOUNTER — TELEMEDICINE (OUTPATIENT)
Dept: FAMILY MEDICINE CLINIC | Age: 47
End: 2022-05-23
Payer: COMMERCIAL

## 2022-05-23 DIAGNOSIS — J45.41 MODERATE PERSISTENT ASTHMA WITH EXACERBATION: Primary | ICD-10-CM

## 2022-05-23 PROCEDURE — 99213 OFFICE O/P EST LOW 20 MIN: CPT | Performed by: STUDENT IN AN ORGANIZED HEALTH CARE EDUCATION/TRAINING PROGRAM

## 2022-05-23 RX ORDER — METHYLPREDNISOLONE 4 MG/1
TABLET ORAL
Qty: 21 TABLET | Refills: 0 | Status: SHIPPED | OUTPATIENT
Start: 2022-05-23 | End: 2022-05-29

## 2022-05-23 NOTE — TELEPHONE ENCOUNTER
Received call from Terris Canavan at [facility] with [employer]. Subjective: Caller states \"Shortness of breath\"     Current Symptoms:   Occasional dry cough  Mild nasal and chest congestion  SOB upon exertion  Chest tightness    Peak flow meter:  Current readin  Personal best:  350    \"I know I need Prednisone\". Hx asthma    2 negative Covid home tests on Friday, 2022. Onset: 6 days ago; worsening    Pain Severity: Mild back pain    Temperature: Denies    What has been tried: Mucinex, Albuterol nebulizer, Albuterol inhaler every 2 hours, Symbicort    LMP: x 3 weeks ago Pregnant: No    Recommended disposition: See in Office Today    Care advice provided, patient verbalizes understanding; denies any other questions or concerns; instructed to call back for any new or worsening symptoms. Patient/Caller agrees with recommended disposition; writer [facility] warm [facility] for appointment scheduling     Attention Provider: Thank you for allowing me to participate in the care of your patient. The patient was connected to triage in response to information provided to the ECC/PSC. Please do not respond through this encounter as the response is not directed to a shared pool.       Reason for Disposition   Asthma medicine (nebulizer or inhaler) is needed more frequently than every 4 hours to keep you comfortable    Protocols used: ASTHMA ATTACK-ADULT-[place]

## 2022-05-23 NOTE — PROGRESS NOTES
Mary Lou Young (:  1975) is a Established patient, here for evaluation of the following:    Assessment & Plan   Below is the assessment and plan developed based on review of pertinent history, physical exam, labs, studies, and medications. 1. Moderate persistent asthma with exacerbation  -     methylPREDNISolone (MEDROL DOSEPACK) 4 MG tablet; Take by mouth., Disp-21 tablet, R-0Normal  Given history of asthma and current symptoms will treat with steroid burst. Discussed likely worsening blood sugar but was at goal recently. Follow-up if not improving. No follow-ups on file. Subjective   HPI     Reports that she just went on an [employer] cruise and returned 10 days ago. Sister who picekd her up from airport did test positive for covid. 3 home tests which were negative     - started with tickle in throat and dry cough  - - noticed that she had increasing shortness of breath. Took 40mg QD that was her daughters after this started. Reports that shortness of rbeath improved. Has been using albuterol nebulizer while this is occurring. Feeling very tight in her chest.   Review of Systems       Objective   Patient-Reported Vitals  Patient-Reported Weight: 172lb  Patient-Reported Height: 5'       Physical Exam             Mary Lou Young, was evaluated through a synchronous (real-time) audio-video encounter. The patient (or guardian if applicable) is aware that this is a billable service, which includes applicable co-pays. This Virtual Visit was conducted with patient's (and/or legal guardian's) consent. The visit was conducted pursuant to the emergency declaration under the [address] [place] [address], [address] [place] [address] authority and the [employer] General Act. Patient identification was verified, and a caregiver was present when appropriate. The patient was located at home in a state where the provider was licensed to provide care. --Juana Estevez, DO

## 2022-05-26 ENCOUNTER — PATIENT MESSAGE (OUTPATIENT)
Dept: FAMILY MEDICINE CLINIC | Age: 47
End: 2022-05-26

## 2022-05-26 DIAGNOSIS — B96.89 ACUTE BACTERIAL SINUSITIS: Primary | ICD-10-CM

## 2022-05-26 DIAGNOSIS — J01.90 ACUTE BACTERIAL SINUSITIS: Primary | ICD-10-CM

## 2022-05-26 NOTE — TELEPHONE ENCOUNTER
From: Hannah Schmitz  To: Dr. Bernie Peter  Sent: 5/26/2022 1:38 PM EDT  Subject: Follow up to virtual visit    Hi Dr. Caldwell Monday. I wanted to follow up with you in advance of the holiday weekend. Since our virtual visit earlier this week, I have definitely seen improvement in my asthma - thank you! However, my cough has not improved and I am pretty sure that whatever it is that I have has turned into a sinus infection. My head is hurting in the typical sinus area (front, top of head and top of cheeks) and even some ear ache. My mucous has gotten thicker and is yellowish green. My back, ribs and stomach are aching, in addition to my throat, from the constant forceful coughing. I'm wondering if I should get an antibiotic? I do not want to feel this way over the long weekend if I can help it. Please let me know how to proceed. Thank you!

## 2022-05-27 DIAGNOSIS — B37.31 VAGINAL YEAST INFECTION: ICD-10-CM

## 2022-05-27 RX ORDER — FLUCONAZOLE 150 MG/1
150 TABLET ORAL ONCE
Qty: 1 TABLET | Refills: 0 | Status: SHIPPED | OUTPATIENT
Start: 2022-05-27 | End: 2022-05-27

## 2022-05-27 RX ORDER — AZITHROMYCIN 250 MG/1
250 TABLET, FILM COATED ORAL SEE ADMIN INSTRUCTIONS
Qty: 6 TABLET | Refills: 0 | Status: SHIPPED | OUTPATIENT
Start: 2022-05-27 | End: 2022-06-01

## 2022-06-28 RX ORDER — BUSPIRONE HYDROCHLORIDE 15 MG/1
TABLET ORAL
Qty: 180 TABLET | Refills: 1 | Status: SHIPPED | OUTPATIENT
Start: 2022-06-28 | End: 2022-09-12 | Stop reason: SDUPTHER

## 2022-06-28 RX ORDER — SERTRALINE HYDROCHLORIDE 100 MG/1
TABLET, FILM COATED ORAL
Qty: 180 TABLET | Refills: 1 | Status: SHIPPED | OUTPATIENT
Start: 2022-06-28 | End: 2022-10-19 | Stop reason: SDUPTHER

## 2022-06-28 NOTE — TELEPHONE ENCOUNTER
Refill Request     CONFIRM preferrred pharmacy with the patient. If Mail Order Rx - Pend for 90 day refill. Last Seen: Last Seen Department: 5/23/2022    Last Seen by PCP: 1/3/2022    Last Written: Buspar: 12/15/21 180 tablet 1 refill                          Zoloft: 1/20/22 180 tablet 1 refill    Next Appointment: 7/11/22  Future Appointments   Date Time Provider Adrianne Wyatt   7/11/2022  8:30 AM MD ISH Everett  [place] [facility]       Future appointment scheduled      Requested Prescriptions     Pending Prescriptions Disp Refills    busPIRone (BUSPAR) 15 MG tablet [Pharmacy Med Name: busPIRone 15 mg tablet (BUSPAR)] 180 tablet 1     Sig: Take 1 tablet (15 mg total) by mouth 2 times a day.  sertraline (ZOLOFT) 100 MG tablet [Pharmacy Med Name: sertraline 100 mg tablet (ZOLOFT)] 180 tablet 1     Sig: Take 2 tablets (200 mg total) by mouth daily.

## 2022-07-11 ENCOUNTER — OFFICE VISIT (OUTPATIENT)
Dept: FAMILY MEDICINE CLINIC | Age: 47
End: 2022-07-11
Payer: COMMERCIAL

## 2022-07-11 VITALS
SYSTOLIC BLOOD PRESSURE: 110 MMHG | WEIGHT: 168 LBS | HEIGHT: 62 IN | OXYGEN SATURATION: 97 % | DIASTOLIC BLOOD PRESSURE: 70 MMHG | BODY MASS INDEX: 30.91 KG/M2 | HEART RATE: 66 BPM

## 2022-07-11 DIAGNOSIS — D75.1 POLYCYTHEMIA: ICD-10-CM

## 2022-07-11 DIAGNOSIS — Z23 NEED FOR PNEUMOCOCCAL VACCINATION: ICD-10-CM

## 2022-07-11 DIAGNOSIS — F41.9 ANXIETY: ICD-10-CM

## 2022-07-11 DIAGNOSIS — F43.21 GRIEVING: ICD-10-CM

## 2022-07-11 DIAGNOSIS — F51.05 INSOMNIA DUE TO OTHER MENTAL DISORDER: ICD-10-CM

## 2022-07-11 DIAGNOSIS — E11.65 UNCONTROLLED TYPE 2 DIABETES MELLITUS WITH HYPERGLYCEMIA (HCC): ICD-10-CM

## 2022-07-11 DIAGNOSIS — Z00.00 ENCOUNTER FOR WELL ADULT EXAM WITHOUT ABNORMAL FINDINGS: Primary | ICD-10-CM

## 2022-07-11 DIAGNOSIS — F99 INSOMNIA DUE TO OTHER MENTAL DISORDER: ICD-10-CM

## 2022-07-11 DIAGNOSIS — R10.9 BILATERAL FLANK PAIN: ICD-10-CM

## 2022-07-11 DIAGNOSIS — Z83.49 FAMILY HISTORY OF THYROID DISORDER: ICD-10-CM

## 2022-07-11 DIAGNOSIS — D75.839 THROMBOCYTOSIS: ICD-10-CM

## 2022-07-11 LAB
BILIRUBIN, POC: NORMAL
BLOOD URINE, POC: NORMAL
CLARITY, POC: NORMAL
COLOR, POC: YELLOW
GLUCOSE URINE, POC: 500
HBA1C MFR BLD: 7.1 %
HGB, POC: 7.1
KETONES, POC: NORMAL
LEUKOCYTE EST, POC: NORMAL
NITRITE, POC: NORMAL
PH, POC: 5
PROTEIN, POC: NORMAL
SPECIFIC GRAVITY, POC: 1.01
UROBILINOGEN, POC: 0.2

## 2022-07-11 PROCEDURE — 85018 HEMOGLOBIN: CPT | Performed by: FAMILY MEDICINE

## 2022-07-11 PROCEDURE — 81002 URINALYSIS NONAUTO W/O SCOPE: CPT | Performed by: FAMILY MEDICINE

## 2022-07-11 PROCEDURE — 83036 HEMOGLOBIN GLYCOSYLATED A1C: CPT | Performed by: FAMILY MEDICINE

## 2022-07-11 PROCEDURE — 99396 PREV VISIT EST AGE 40-64: CPT | Performed by: FAMILY MEDICINE

## 2022-07-11 PROCEDURE — 90677 PCV20 VACCINE IM: CPT | Performed by: FAMILY MEDICINE

## 2022-07-11 PROCEDURE — 99214 OFFICE O/P EST MOD 30 MIN: CPT | Performed by: FAMILY MEDICINE

## 2022-07-11 PROCEDURE — 90471 IMMUNIZATION ADMIN: CPT | Performed by: FAMILY MEDICINE

## 2022-07-11 RX ORDER — HYDROXYZINE HYDROCHLORIDE 25 MG/1
25 TABLET, FILM COATED ORAL NIGHTLY PRN
Qty: 30 TABLET | Refills: 5 | Status: SHIPPED | OUTPATIENT
Start: 2022-07-11 | End: 2022-09-06 | Stop reason: SDUPTHER

## 2022-07-11 ASSESSMENT — PATIENT HEALTH QUESTIONNAIRE - PHQ9
2. FEELING DOWN, DEPRESSED OR HOPELESS: 1
SUM OF ALL RESPONSES TO PHQ QUESTIONS 1-9: 1
SUM OF ALL RESPONSES TO PHQ QUESTIONS 1-9: 1
SUM OF ALL RESPONSES TO PHQ9 QUESTIONS 1 & 2: 1
1. LITTLE INTEREST OR PLEASURE IN DOING THINGS: 0
SUM OF ALL RESPONSES TO PHQ QUESTIONS 1-9: 1
SUM OF ALL RESPONSES TO PHQ QUESTIONS 1-9: 1

## 2022-07-11 ASSESSMENT — ANXIETY QUESTIONNAIRES
1. FEELING NERVOUS, ANXIOUS, OR ON EDGE: 3
3. WORRYING TOO MUCH ABOUT DIFFERENT THINGS: 2
7. FEELING AFRAID AS IF SOMETHING AWFUL MIGHT HAPPEN: 1
6. BECOMING EASILY ANNOYED OR IRRITABLE: 3
IF YOU CHECKED OFF ANY PROBLEMS ON THIS QUESTIONNAIRE, HOW DIFFICULT HAVE THESE PROBLEMS MADE IT FOR YOU TO DO YOUR WORK, TAKE CARE OF THINGS AT HOME, OR GET ALONG WITH OTHER PEOPLE: SOMEWHAT DIFFICULT
2. NOT BEING ABLE TO STOP OR CONTROL WORRYING: 1
4. TROUBLE RELAXING: 2
GAD7 TOTAL SCORE: 13
5. BEING SO RESTLESS THAT IT IS HARD TO SIT STILL: 1

## 2022-07-11 ASSESSMENT — ENCOUNTER SYMPTOMS
RESPIRATORY NEGATIVE: 1
BACK PAIN: 1
EYES NEGATIVE: 1
GASTROINTESTINAL NEGATIVE: 1

## 2022-07-11 NOTE — PATIENT INSTRUCTIONS
PLEASE ALWAYS ARRIVE 15 MINUTES BEFORE YOUR SCHEDULED APPOINTMENT TIME  - this will allow for any paperwork to completed, your information to be updated, and for the rooming process to be completed before the doctor sees you. We appreciate your assistance in this matter, as it allows for all patients to be seen in a more timely manner, including you and/or your family. Everyone's time is important to us, and we value trying to stay on time as much as we possibly can. Well Visit, Ages 25 to 48: Care Instructions  Overview     Well visits can help you stay healthy. Your doctor has checked your overall health and may have suggested ways to take good care of yourself. Your doctor also may have recommended tests. At home, you can help prevent illness withhealthy eating, regular exercise, and other steps. Follow-up care is a key part of your treatment and safety. Be sure to make and go to all appointments, and call your doctor if you are having problems. It's also a good idea to know your test results and keep alist of the medicines you take. How can you care for yourself at home?  Get screening tests that you and your doctor decide on. Screening helps find diseases before any symptoms appear.  Eat healthy foods. Choose fruits, vegetables, whole grains, protein, and low-fat dairy foods. Limit fat, especially saturated fat. Reduce salt in your diet.  Limit alcohol. If you are a man, have no more than 2 drinks a day or 14 drinks a week. If you are a woman, have no more than 1 drink a day or 7 drinks a week.  Get at least 30 minutes of physical activity on most days of the week. Walking is a good choice. You also may want to do other activities, such as running, swimming, cycling, or playing tennis or team sports. Discuss any changes in your exercise program with your doctor.  Reach and stay at a healthy weight.  This will lower your risk for many problems, such as obesity, diabetes, heart disease, and high blood pressure.  Do not smoke or allow others to smoke around you. If you need help quitting, talk to your doctor about stop-smoking programs and medicines. These can increase your chances of quitting for good.  Care for your mental health. It is easy to get weighed down by worry and stress. Learn strategies to manage stress, like deep breathing and mindfulness, and stay connected with your family and community. If you find you often feel sad or hopeless, talk with your doctor. Treatment can help.  Talk to your doctor about whether you have any risk factors for sexually transmitted infections (STIs). You can help prevent STIs if you wait to have sex with a new partner (or partners) until you've each been tested for STIs. It also helps if you use condoms (male or female condoms) and if you limit your sex partners to one person who only has sex with you. Vaccines are available for some STIs, such as HPV.  Use birth control if it's important to you to prevent pregnancy. Talk with your doctor about the choices available and what might be best for you.  If you think you may have a problem with alcohol or drug use, talk to your doctor. This includes prescription medicines (such as amphetamines and opioids) and illegal drugs (such as cocaine and methamphetamine). Your doctor can help you figure out what type of treatment is best for you.  Protect your skin from too much sun. When you're outdoors from 10 a.m. to 4 p.m., stay in the shade or cover up with clothing and a hat with a wide brim. Wear sunglasses that block UV rays. Even when it's cloudy, put broad-spectrum sunscreen (SPF 30 or higher) on any exposed skin.  See a dentist one or two times a year for checkups and to have your teeth cleaned.  Wear a seat belt in the car. When should you call for help? Watch closely for changes in your health, and be sure to contact your doctor if you have any problems or symptoms that concern you.   Where can you learn more? Go to [email or web address] and sign in to your [facility] account. Enter P072 in the [facility] box to learn more about \"Well Visit, Ages 25 to 48: Care Instructions. \"     If you do not have an account, please click on the \"Sign Up Now\" link. Current as of: October 6, 2021               Content Version: 13.3  © 3989-1034 [employer]. Care instructions adapted under license by [facility] ([facility]). If you have questions about a medical condition or this instruction, always ask your healthcare professional. [address] any warranty or liability for your use of this information.

## 2022-07-11 NOTE — PROGRESS NOTES
daily. Yes MARIANNA Alvarenga   traZODone (DESYREL) 50 MG tablet Take 1-2 tablets by mouth nightly as needed for Sleep Yes Duane Cancel, MD   pantoprazole (PROTONIX) 20 MG tablet Take 1 tablet (20 mg total) by mouth daily. Yes Duane Cancel, MD   metFORMIN (GLUCOPHAGE) 1000 MG tablet Take 1 tablet (1,000 mg total) by mouth 2 times a day. Yes Duane Cancel, MD   Liraglutide (VICTOZA) 18 MG/3ML SOPN SC injection Inject 0.3 mLs (1.8 mg total) subcutaneously daily. Yes Duane Cancel, MD   Insulin Pen Needle (EASY TOUCH PEN NEEDLES) 31G X 5 MM MISC Inject 1 each into the skin daily Yes MARIANNA Fallon CNP   blood glucose test strips (AGAMATRIX PRESTO TEST) strip 1 each by In Vitro route 3 times daily As needed. Yes MARIANNA Fallon CNP   AgaMatrix Ultra-Thin Lancets MISC 1 each by Does not apply route 3 times daily (before meals) Yes MARIANNA Fallon CNP   budesonide-formoterol (SYMBICORT) 160-4.5 MCG/ACT AERO Inhale 2 puffs into the lungs 2 times daily Yes MARIANNA Fallon CNP   albuterol sulfate HFA (VENTOLIN HFA) 108 (90 Base) MCG/ACT inhaler Inhale 2 puffs into the lungs every 6 hours as needed for wheezing. Yes MARIANNA Fallon CNP   pravastatin (PRAVACHOL) 40 MG tablet Take 1 tablet (40 mg total) by mouth daily.  Yes MARIANNA Fallon CNP   zinc gluconate 50 MG tablet Take 50 mg by mouth daily Yes Historical Provider, MD   EPINEPHrine (AUVI-Q) 0.3 MG/0.3ML SOAJ injection Use as directed for allergic reaction Yes MARIANNA Gongora CNP   Elastic Bandages & Supports ([address]) [address] 1 each by Does not apply route daily as needed (varicose veins) Yes MARIANNA Gongora CNP   EASY TOUCH PEN NEEDLES 31G X 5 MM Edna Awksim Yes Skyler Parsons MD   Blood Glucose Monitoring Suppl (AGAMATRIX PRESTO) w/Device KIT 1 each by Does not apply route 3 times daily (before meals) Yes Skyler Parsons MD   Cholecalciferol (VITAMIN D3) 2000 UNITS CAPS Take by mouth Yes Historical Provider, MD   Multiple Vitamins-Minerals (THERAPEUTIC MULTIVITAMIN-MINERALS) tablet Take 1 tablet by mouth daily Yes Historical Provider, MD   B Complex-Folic Acid (B COMPLEX-VITAMIN B12) TABS Take  by mouth. Yes Silvia Scott MD   calcium carbonate (OSCAL) 500 MG TABS tablet Take 500 mg by mouth daily. Yes Historical Provider, MD   cetirizine (ZYRTEC) 10 MG tablet Take 10 mg by mouth daily.  Yes Historical Provider, MD       Past Medical History:   Diagnosis Date    Allergic rhinitis     Anxiety 1997    Asthma 1978    persisitent    GERD (gastroesophageal reflux disease) 1985    Headache(784.0) 1993    Hyperlipidemia 3/11/2014    Obesity     Seizures ([facility] [address] 2007    one seizure    Type II or unspecified type diabetes mellitus without mention of complication, not stated as uncontrolled 2012       Past Surgical History:   Procedure Laterality Date    VARICOSE VEIN SURGERY Right     WISDOM TOOTH EXTRACTION      2 removed       Family History   Problem Relation Age of Onset    Arthritis Mother     Arthritis Father         OA    Diabetes Father     High Blood Pressure Father     High Cholesterol Father    Jyoti Rojas Vision Loss Father     Heart Disease Father 61        MI. quad bypass    Allergies Sister         allergy induced asthma    Arthritis Maternal Grandmother         OA    Cancer Maternal Grandmother 80        colon    Heart Disease Maternal Grandmother     High Blood Pressure Maternal Grandmother     Kidney Disease Maternal Grandmother     Arthritis Maternal Grandfather     Cancer Maternal Grandfather         esophageal    Arthritis Paternal Grandmother     Diabetes Paternal Grandmother     Arthritis Paternal Grandfather     Cancer Paternal Grandfather 79        colon    Cancer Maternal Aunt 55        breast       Social History     Tobacco Use    Smoking status: Current Every Day Smoker     Packs/day: 1.00     Years: 18.00     Pack years: 18.00     Last attempt to quit: 1/10/2013     Years since quittin.5    Smokeless tobacco: Never Used    Tobacco comment: 2013 quit   Substance Use Topics    Alcohol use: Yes     Comment: 1 drink a month    Drug use: No     Comment: 13       Objective   /70 (Site: Right Upper Arm, Position: Sitting, Cuff Size: Small Adult)   Pulse 66   Ht 5' 2\" (1.575 m)   Wt 168 lb (76.2 kg)   SpO2 97%   BMI 30.73 kg/m²   Wt Readings from Last 3 Encounters:   22 168 lb (76.2 kg)   22 172 lb (78 kg)   21 171 lb 12.8 oz (77.9 kg)     There were no vitals filed for this visit. Physical Exam  Vitals and nursing note reviewed. Constitutional:       Appearance: Normal appearance. HENT:      Head: Normocephalic and atraumatic. Right Ear: Tympanic membrane, ear canal and external ear normal. There is no impacted cerumen. Left Ear: Tympanic membrane, ear canal and external ear normal. There is no impacted cerumen. Nose: Nose normal. No congestion or rhinorrhea. Mouth/Throat:      Mouth: Mucous membranes are moist.      Pharynx: Oropharynx is clear. No oropharyngeal exudate or posterior oropharyngeal erythema. Eyes:      General: No scleral icterus. Right eye: No discharge. Left eye: No discharge. Conjunctiva/sclera: Conjunctivae normal.      Pupils: Pupils are equal, round, and reactive to light. Neck:      Vascular: No carotid bruit. Cardiovascular:      Rate and Rhythm: Normal rate and regular rhythm. Heart sounds: Normal heart sounds. No murmur heard. No friction rub. No gallop. Pulmonary:      Effort: Pulmonary effort is normal. No respiratory distress. Breath sounds: Normal breath sounds. No stridor. No wheezing, rhonchi or rales. Chest:   Breasts:      Right: No supraclavicular adenopathy. Left: No supraclavicular adenopathy. Abdominal:      General: Bowel sounds are normal.      Palpations: Abdomen is soft. There is no mass. Tenderness: There is no abdominal tenderness. There is no right CVA tenderness or left CVA tenderness. Musculoskeletal:      Cervical back: Neck supple. No muscular tenderness. Right lower leg: No edema. Left lower leg: No edema. Lymphadenopathy:      Head:      Right side of head: No submental, submandibular, tonsillar, preauricular, posterior auricular or occipital adenopathy. Left side of head: No submental, submandibular, tonsillar, preauricular, posterior auricular or occipital adenopathy. Cervical: No cervical adenopathy. Upper Body:      Right upper body: No supraclavicular adenopathy. Left upper body: No supraclavicular adenopathy. Lower Body: No right inguinal adenopathy. No left inguinal adenopathy. Skin:     General: Skin is warm and dry. Neurological:      General: No focal deficit present. Mental Status: She is alert. Deep Tendon Reflexes:      Reflex Scores:       Patellar reflexes are 2+ on the right side and 2+ on the left side. Achilles reflexes are 2+ on the right side and 2+ on the left side. Psychiatric:         Mood and Affect: Mood is anxious and depressed. Affect is tearful. Speech: Speech normal.         Behavior: Behavior normal. Behavior is cooperative.          Cognition and Memory: Cognition normal.       Hemoglobin A1C = 7.1%    Results for POC orders placed in visit on 07/11/22   POCT Urinalysis no Micro   Result Value Ref Range    Color, UA yellow     Clarity, UA cloudy     Glucose, UA      Bilirubin, UA neg     Ketones, UA neg     Spec Grav, UA 1.015     Blood, UA POC neg     pH, UA 5.0     Protein, UA POC neg     Urobilinogen, UA 0.2     Leukocytes, UA neg     Nitrite, UA neg    POCT Hemoglobin   Result Value Ref Range    Hemoglobin 7.1          PHQ-9  7/11/2022 1/4/2021 6/24/2019 8/17/2018 2/22/2018 1/29/2015 12/7/2013   Little interest or pleasure in doing things 0 2 0 2 0 0 0   Feeling down, depressed, or Ketones, UA neg     Spec Grav, UA 1.015     Blood, UA POC neg     pH, UA 5.0     Protein, UA POC neg     Urobilinogen, UA 0.2     Leukocytes, UA neg     Nitrite, UA neg    POCT Hemoglobin   Result Value Ref Range    Hemoglobin 7.1      CBC  Specimen:  Whole Blood   Ref Range & Units 4 d ago   WBC 3.8 - 10.8 10E3/uL 10.0    RBC 3.80 - 5.10 10E6/uL 5.72 High     Hemoglobin 11.7 - 15.5 g/dL 16.0 High     Hematocrit 35.0 - 45.0 % 48.9 High     MCV 80.0 - 100.0 fL 85.4    MCH 27.0 - 33.0 pg 27.9    MCHC 32.0 - 36.0 g/dL 32.7    RDW 11.0 - 15.0 % 14.2    Platelets 345 - 476 28T4/ High     MPV 7.5 - 11.5 fL 6.4 Low     Resulting Agency   [facility]   Specimen Collected: 07/07/22  8:33 AM Last Resulted: 07/07/22  1:21 PM   Received From: [facility]  Result Received: 07/11/22  8:29 AM       More Data from [address] [place] [address] St S to CBC  Component      WBC   RBC   Hemoglobin   Hematocrit   MCV   MCH   MCHC   RDW   Platelets   MPV     Component 07/07/2022 07/08/2021        WBC 10.0 6.7   RBC 5.72    5.25 High       Hemoglobin 16.0    14.9   Hematocrit 48.9    46.2 High       MCV 85.4 87.9   MCH 27.9 28.4   MCHC 32.7 32.3   RDW 14.2 14.0   Platelets 176    765   MPV 6.4    6.5 Low         Cholesterol, Total 0 - 200 mg/dL 212 High      Triglycerides 10 - 149 mg/dL 244 High      HDL 60 - 92 mg/dL 63       Comprehensive metabolic panel  Specimen:  Plasma   Ref Range & Units 4 d ago Comments   Sodium 133 - 146 mmol/L 138     Potassium 3.5 - 5.3 mmol/L 4.6     Chloride 98 - 110 mmol/L 100     CO2 21 - 33 mmol/L 27     Anion Gap 3 - 16 mmol/L 11     BUN 7 - 25 mg/dL 14     Creatinine 0.60 - 1.30 mg/dL 0.71     Glucose 70 - 100 mg/dL 138 High      Calcium 8.6 - 10.3 mg/dL 10.1     Total Bilirubin 0.0 - 1.5 mg/dL 0.8     AST 13 - 39 U/L 19     ALT 7 - 52 U/L 16     Alkaline Phosphatase 36 - 125 U/L 61     Total Protein 6.4 - 8.9 g/dL 7.3     Albumin 3.5 - 5.7 g/dL 4.6     Osmolality, Calculated 278 - 305 mOsm/kg 289     EGFR  >90 Assessment   Plan   1. Encounter for well adult exam without abnormal findings  Health Maintenance reviewed - Prevnar given. She already has colonoscopy scheduled for next month. Specific topics reviewed: importance of regular dental care, importance of varied diet, minimize junk food, importance of regular exercise, seat belts and sunscreen. 2. Uncontrolled type 2 diabetes mellitus with hyperglycemia (HCC)  -     POCT glycosylated hemoglobin (Hb A1C)  Counseling at today's visit: focused on the need for regular aerobic exercise and discussed the advantages of a diet low in carbohydrates. Work on changing 1 unhealthy habit for next appointment. Recheck in 3 months. Will keep medications the same today. Glucosuria from [place]. Microalbumin through  was normal.    3. Family history of thyroid disorder  -     TSH with Reflex; Future  4. Polycythemia  -     CBC with Auto Differential; Future  New on recent CBC. Will recheck in the near future non-fasting. 5. Thrombocytosis  -     CBC with Auto Differential; Future  On recent CBC. Will recheck in the near future non-fasting. 6. Bilateral flank pain  -     POCT Urinalysis no Micro  -     Culture, Urine  Uncertain etiology. No signs of infection or stone today based on UA. Will await culture result. Discussed this could be more musculoskeletal in nature, in which case it should resolve with stretching, ice/heat, and/or over-the-counter pain reliever. 7. Anxiety - uncontrolled  -     hydrOXYzine HCl (ATARAX) 25 MG tablet; Take 1 tablet by mouth nightly as needed for Anxiety, Disp-30 tablet, R-5Normal  8. Sajiving  Encouraged scheduling with Dr. Greta Rodriguez. 9. Insomnia due to other mental disorder - uncontrolled  -     hydrOXYzine HCl (ATARAX) 25 MG tablet; Take 1 tablet by mouth nightly as needed for Anxiety, Disp-30 tablet, R-5Normal  Call or return to clinic prn if these symptoms worsen or fail to improve as anticipated. Stop trazodone.    10. Need for pneumococcal vaccination  -     Pneumococcal, PCV20, PREVNAR 21, (age 25 yrs+), IM, PF         Personalized Preventive Plan   Current Health Maintenance Status  Immunization History   Administered Date(s) Administered    COVID-19, PFIZER PURPLE top, DILUTE for use, (age 15 y+), 30mcg/0.3mL 01/08/2021, 01/29/2021, 10/21/2021    Hepatitis A 10/08/2016, 07/31/2017, 07/31/2017    Hepatitis A Adult (Havrix, Vaqta) 10/08/2016    Hepatitis B 10/08/2016, 01/29/2017    Hepatitis B (Engerix-B) 10/08/2016, 01/29/2017    Influenza A ([id number]) Vaccine PF IM 11/16/2009    Influenza Quadv 10/25/2021    Influenza Vaccine, unspecified formulation 09/25/2013    Influenza Virus Vaccine 09/25/2013, 10/15/2014, 12/01/2016, 10/25/2017    Influenza Whole 10/15/2014    Influenza, MDCK Quadv, IM, PF (Flucelvax 2 yrs and older) 10/25/2021    Influenza, Blair Golas, IM, (6 mo and older Fluzone, Flulaval, Fluarix and 3 yrs and older Afluria) 12/01/2016, 10/09/2018    Influenza, Quadv, IM, PF (6 mo and older Fluzone, Flulaval, Fluarix, and 3 yrs and older Afluria) 10/09/2018, 10/01/2020    Pneumococcal Polysaccharide ([id number]) 01/02/2014, 01/04/2021    Td (Adult), 5 Lf Tetanus Toxoid, Pf (Tenivac, Decavac) 01/29/2017    Tdap (Boostrix, Adacel) 08/06/2012    Typhoid Vaccine 01/29/2017    Typhoid Vi capsular polysaccharide (Typhim VI) 01/29/2017        Health Maintenance   Topic Date Due    Lipids  01/16/2020    Colorectal Cancer Screen  Never done    Diabetic microalbuminuria test  01/04/2022    Depression Screen  01/04/2022    Pneumococcal 0-64 years Vaccine (2 - PCV) 01/04/2022    Hepatitis B vaccine (3 of 3 - Risk 3-dose series) 01/03/2023 (Originally 5/29/2017)    Flu vaccine (1) 09/01/2022    Diabetic retinal exam  11/20/2022    Diabetic foot exam  01/03/2023    A1C test (Diabetic or Prediabetic)  01/03/2023    Cervical cancer screen  12/29/2025    DTaP/Tdap/Td vaccine (3 - Td or Tdap) 01/29/2027    COVID-19 Vaccine  Completed    Hepatitis C screen  Completed    HIV screen  Completed    Hepatitis A vaccine  Aged Out    Hib vaccine  Aged Out    Meningococcal (ACWY) vaccine  Aged Out     Recommendations for IMT ([employer]) Due: see orders and patient instructions/AVS.    Return in about 3 months (around 10/11/2022) for Diabetes; schedule with Dr. Dae Degroot for complicated grieving.

## 2022-07-13 LAB — URINE CULTURE, ROUTINE: NORMAL

## 2022-07-25 ENCOUNTER — TELEMEDICINE (OUTPATIENT)
Dept: PSYCHOLOGY | Age: 47
End: 2022-07-25
Payer: COMMERCIAL

## 2022-07-25 DIAGNOSIS — F41.9 ANXIETY: Primary | ICD-10-CM

## 2022-07-25 DIAGNOSIS — F43.21 GRIEF: ICD-10-CM

## 2022-07-25 PROCEDURE — 90791 PSYCH DIAGNOSTIC EVALUATION: CPT | Performed by: PSYCHOLOGIST

## 2022-07-25 NOTE — PROGRESS NOTES
Behavioral Health Consultation  [facility], [address] [place] [address]  Psychologist  7/25/2022  12:36 PM EDT    Time spent with Patient: 30 minutes  This is patient's first [facility] appointment. Reason for Consult:    Chief Complaint   Patient presents with    Other     grief       Referring Provider: Alexa Herman MD      Pt provided informed consent for the behavioral health program. Discussed with patient model of service to include the limits of confidentiality (i.e. abuse reporting, suicide intervention, etc.) and short-term intervention focused approach. Pt indicated understanding. Feedback given to PCP. TELEHEALTH VISIT -- Audio/Visual (During [facility]-90 public health emergency)  }  Kamar Gomez, was evaluated through a synchronous (real-time) audio-video encounter. The patient (or guardian if applicable) is aware that this is a billable service. The visit was conducted pursuant to the emergency declaration under the [address] [place] [address] authority and the DPSI and [employer] General Act. Patient identification was verified, and a caregiver was present when appropriate. The patient was located in a state where the provider was licensed to provide care. Conducted a risk-benefit analysis and determined that the patient's presenting problems are consistent with the use of telepsychology. Determined that the patient has sufficient knowledge and skills in the use of technology enabling them to adequately benefit from telepsychology. It was determined that this patient was able to be properly treated without an in-person session. Patient verified that they were currently located at the [facility] address that was provided during registration.       Verified the following information:  Patient's identification: Yes  Patient location: [address] [place] [address]  Patient's call back number: [phone]  Patient's emergency contact's name and number, as well as permission to contact them if needed: Extended Emergency Contact Information  Primary Emergency Contact: Shamika Owens [address] Phone: [phone]  Mobile Phone: [phone]  Relation: Parent    Provider location: [place] Brooks:  Has had an EAP counselor for many years. Would like a more active therapist to learn coping strategies.  boyfriend  in workplace accident. Sleep disrupted- nights she cant turn off the questions around the accident and grief. No nightmares. Other issues from past have arisen after his death- questions own self-worth, trusting her self. Boyfriend was her person - many past exes have cheated. Felt he was different but started hearing things about his after he  that makes her question what she thought she knew. Hydroxyzine has been helpful. Has been on zoloft and buspar for year- questioned if she was numb on it until losing her boyfriend. Realizes how helpful medications have actually been.       O:  MSE:    Attitude: cooperative, friendly, and tearful  Consciousness: alert  Orientation: oriented to person, place, time, general circumstance  Memory: recent and remote memory intact  Attention/Concentration: intact during session  Speech: normal rate and volume, well-articulated  Mood: \"OK\"  Affect: euthymic and congruent  Perception: within normal limits  Thought Content: within normal limits  Thought Process: logical, coherent and goal-directed  Insight: good  Judgment: intact  Ability to understand instructions: Yes  Ability to respond meaningfully: Yes  Morbid Ideation: no   Suicide Assessment: no suicidal ideation, plan, or intent  Homicidal Ideation: no    History:    Medications:   Current Outpatient Medications   Medication Sig Dispense Refill    hydrOXYzine HCl (ATARAX) 25 MG tablet Take 1 tablet by mouth nightly as needed for Anxiety 30 tablet 5    busPIRone (BUSPAR) 15 MG tablet Take 1 tablet (15 mg total) by mouth 2 times a day. 180 tablet 1    sertraline (ZOLOFT) 100 MG tablet Take 2 tablets (200 mg total) by mouth daily. 180 tablet 1    empagliflozin (JARDIANCE) 10 MG tablet Take 1 tablet (10 mg total) by mouth daily. 90 tablet 1    pantoprazole (PROTONIX) 20 MG tablet Take 1 tablet (20 mg total) by mouth daily. 90 tablet 1    metFORMIN (GLUCOPHAGE) 1000 MG tablet Take 1 tablet (1,000 mg total) by mouth 2 times a day. 180 tablet 1    Liraglutide (VICTOZA) 18 MG/3ML SOPN SC injection Inject 0.3 mLs (1.8 mg total) subcutaneously daily. 27 mL 3    Insulin Pen Needle (EASY TOUCH PEN NEEDLES) 31G X 5 MM MISC Inject 1 each into the skin daily 100 each 0    blood glucose test strips (AGAMATRIX PRESTO TEST) strip 1 each by In Vitro route 3 times daily As needed. 300 each 3    AgaMatrix Ultra-Thin Lancets MISC 1 each by Does not apply route 3 times daily (before meals) 300 each 3    budesonide-formoterol (SYMBICORT) 160-4.5 MCG/ACT AERO Inhale 2 puffs into the lungs 2 times daily 3 Inhaler 2    albuterol sulfate HFA (VENTOLIN HFA) 108 (90 Base) MCG/ACT inhaler Inhale 2 puffs into the lungs every 6 hours as needed for wheezing. 1 Inhaler 5    pravastatin (PRAVACHOL) 40 MG tablet Take 1 tablet (40 mg total) by mouth daily.  90 tablet 3    zinc gluconate 50 MG tablet Take 50 mg by mouth daily      EPINEPHrine (AUVI-Q) 0.3 MG/0.3ML SOAJ injection Use as directed for allergic reaction 2 each 0    Elastic Bandages & Supports (MEDICAL COMPRESSION STOCKINGS) MISC 1 each by Does not apply route daily as needed (varicose veins) 1 each 0    EASY TOUCH PEN NEEDLES 31G X 5 MM MISC USE ONE DAILY 100 each 3    Blood Glucose Monitoring Suppl (AGAMATRIX PRESTO) w/Device KIT 1 each by Does not apply route 3 times daily (before meals) 1 kit 0    Cholecalciferol (VITAMIN D3) 2000 UNITS CAPS Take by mouth      Multiple Vitamins-Minerals (THERAPEUTIC MULTIVITAMIN-MINERALS) tablet Take 1 tablet by mouth daily      B Complex-Folic Acid (B COMPLEX-VITAMIN B12) TABS Take  by mouth. 0    calcium carbonate (OSCAL) 500 MG TABS tablet Take 500 mg by mouth daily. cetirizine (ZYRTEC) 10 MG tablet Take 10 mg by mouth daily. No current facility-administered medications for this visit. Social History:   Social History     Socioeconomic History    Marital status:      Spouse name: Not on file    Number of children: Not on file    Years of education: Not on file    Highest education level: Not on file   Occupational History    Occupation: [employer]   Tobacco Use    Smoking status: Every Day     Packs/day: 1.00     Years: 18.00     Pack years: 18.00     Types: Cigarettes     Last attempt to quit: 1/10/2013     Years since quittin.5    Smokeless tobacco: Never    Tobacco comments:     2013 quit   Substance and Sexual Activity    Alcohol use: Yes     Comment: 1 drink a month    Drug use: No     Comment: 13    Sexual activity: Yes   Other Topics Concern    Not on file   Social History Narrative    Not on file     Social Determinants of Health     Financial Resource Strain: Low Risk     Difficulty of Paying Living Expenses: Not hard at all   Food Insecurity: No Food Insecurity    Worried About [address] in the Last Year: Never true    [address] in the Last Year: Never true   Transportation Needs: No Transportation Needs    Lack of Transportation (Medical): No    Lack of Transportation (Non-Medical): No   Physical Activity: Not on file   Stress: Not on file   Social Connections: Not on file   Intimate Partner Violence: Not on file   Housing Stability: Low Risk     Unable to Pay for Housing in the Last Year: No    Number of Places Lived in the Last Year: 1    Unstable Housing in the Last Year: No     TOBACCO:   reports that she has been smoking. She has a 18.00 pack-year smoking history. She has never used smokeless tobacco.  ETOH:   reports current alcohol use.   Family History:   Family History   Problem Relation Age Return in about 2 weeks (around 8/8/2022). Please note that portions of this note may have been completed with a voice recognition program. Efforts were made to edit the dictations but occasionally words are mis-transcribed.

## 2022-08-09 NOTE — TELEPHONE ENCOUNTER
Refill Request     CONFIRM preferrred pharmacy with the patient. If Mail Order Rx - Pend for 90 day refill.       Last Seen: Last Seen Department: 7/11/2022  Last Seen by PCP: 7/11/2022    Last Written: 07/14/2021 90 tablet 3 refills    Next Appointment:   Future Appointments   Date Time Provider Adrianne Wyatt   8/11/2022 12:00 PM [address]   10/25/2022 10:00 AM MD ISH Blanco  [place] [facility]       Future appointment scheduled      Requested Prescriptions     Pending Prescriptions Disp Refills    pravastatin (PRAVACHOL) 40 MG tablet 90 tablet 3

## 2022-08-09 NOTE — TELEPHONE ENCOUNTER
Refill Request     CONFIRM preferrred pharmacy with the patient. If Mail Order Rx - Pend for 90 day refill. Last Seen: Last Seen Department: 7/11/2022  Last Seen by PCP: Visit date not found    Last Written: 08/25/2021 1 inhaler 5 refills    Next Appointment:   Future Appointments   Date Time Provider Adrianne Wyatt   8/11/2022 12:00 PM [address]   10/25/2022 10:00 AM MD ISH Silvestre  [place] [facility]       Future appointment scheduled      Requested Prescriptions     Pending Prescriptions Disp Refills    albuterol sulfate HFA (VENTOLIN HFA) 108 (90 Base) MCG/ACT inhaler 1 each 5     Sig: Inhale 2 puffs into the lungs every 6 hours as needed for wheezing.

## 2022-08-10 RX ORDER — ALBUTEROL SULFATE 90 UG/1
AEROSOL, METERED RESPIRATORY (INHALATION)
Qty: 1 EACH | Refills: 5 | Status: SHIPPED | OUTPATIENT
Start: 2022-08-10

## 2022-08-10 RX ORDER — PRAVASTATIN SODIUM 40 MG
TABLET ORAL
Qty: 90 TABLET | Refills: 3 | Status: SHIPPED | OUTPATIENT
Start: 2022-08-10 | End: 2022-10-19 | Stop reason: SDUPTHER

## 2022-09-06 ENCOUNTER — PATIENT MESSAGE (OUTPATIENT)
Dept: FAMILY MEDICINE CLINIC | Age: 47
End: 2022-09-06

## 2022-09-06 DIAGNOSIS — F41.9 ANXIETY: ICD-10-CM

## 2022-09-06 DIAGNOSIS — F99 INSOMNIA DUE TO OTHER MENTAL DISORDER: ICD-10-CM

## 2022-09-06 DIAGNOSIS — F51.05 INSOMNIA DUE TO OTHER MENTAL DISORDER: ICD-10-CM

## 2022-09-06 RX ORDER — PANTOPRAZOLE SODIUM 20 MG/1
TABLET, DELAYED RELEASE ORAL
Qty: 90 TABLET | Refills: 1 | Status: SHIPPED | OUTPATIENT
Start: 2022-09-06

## 2022-09-06 RX ORDER — HYDROXYZINE HYDROCHLORIDE 25 MG/1
25 TABLET, FILM COATED ORAL NIGHTLY PRN
Qty: 90 TABLET | Refills: 1 | Status: SHIPPED | OUTPATIENT
Start: 2022-09-06 | End: 2022-10-06

## 2022-09-06 NOTE — TELEPHONE ENCOUNTER
Refill Request     CONFIRM preferrred pharmacy with the patient. If Mail Order Rx - Pend for 90 day refill. Last Seen: Last Seen Department: 7/11/2022  Last Seen by PCP: Visit date not found    Last Written: 1/20/22 with 1 refill #90    Next Appointment:   Future Appointments   Date Time Provider Adrianne Wyatt   10/25/2022 10:00 AM Yesenia Black MD [facility] [place] [facility]       Future appointment scheduled      Requested Prescriptions     Pending Prescriptions Disp Refills    pantoprazole (PROTONIX) 20 MG tablet [Pharmacy Med Name: pantoprazole 20 mg tablet,delayed release (PROTONIX)] 90 tablet 1     Sig: Take 1 tablet (20 mg total) by mouth daily.

## 2022-09-12 RX ORDER — BUSPIRONE HYDROCHLORIDE 15 MG/1
TABLET ORAL
Qty: 180 TABLET | Refills: 1 | Status: SHIPPED | OUTPATIENT
Start: 2022-09-12

## 2022-09-12 NOTE — TELEPHONE ENCOUNTER
Refill Request     CONFIRM preferrred pharmacy with the patient. If Mail Order Rx - Pend for 90 day refill. Last Seen: Last Seen Department: 7/11/2022  Last Seen by PCP: 7/11/2022    Last Written: 06/28/22 1 refill    If no future appointment scheduled, route STAFF MESSAGE with patient name to the [facility] for scheduling. Next Appointment:   Future Appointments   Date Time Provider Adrianne Wyatt   10/25/2022 10:00 AM MD ISH Coelho  [place] - SHANTHI       Message sent to [address] [place] [address] to schedule appt with patient? NO      Requested Prescriptions     Pending Prescriptions Disp Refills    busPIRone (BUSPAR) 15 MG tablet 180 tablet 1     Sig: Take 1 tablet (15 mg total) by mouth 2 times a day.

## 2022-09-26 DIAGNOSIS — E11.9 TYPE 2 DIABETES MELLITUS WITHOUT COMPLICATION, WITHOUT LONG-TERM CURRENT USE OF INSULIN (HCC): ICD-10-CM

## 2022-09-27 NOTE — TELEPHONE ENCOUNTER
Refill Request     CONFIRM preferrred pharmacy with the patient. If Mail Order Rx - Pend for 90 day refill. Last Seen: Last Seen Department: 7/11/2022  Last Seen by PCP: Visit date not found    Last Written: 03/30/2022 90 tablet 1 refills     If no future appointment scheduled, route STAFF MESSAGE with patient name to the [facility] for scheduling. Next Appointment:   Future Appointments   Date Time Provider Adrianne Wyatt   10/25/2022 10:00 AM MD ISH Simmons  [place] - DYFANNY       Message sent to [address] [place] [address] to schedule appt with patient? NO      Requested Prescriptions     Pending Prescriptions Disp Refills    empagliflozin (JARDIANCE) 10 MG tablet 90 tablet 1     Sig: Take 1 tablet (10 mg total) by mouth daily.

## 2022-09-27 NOTE — TELEPHONE ENCOUNTER
Refill Request     CONFIRM preferrred pharmacy with the patient. If Mail Order Rx - Pend for 90 day refill. Last Seen: Last Seen Department: 7/11/2022  Last Seen by PCP: 7/11/2022    Last Written: 01/07/2022 180 tablet 1 refills     If no future appointment scheduled, route STAFF MESSAGE with patient name to the [facility] for scheduling. Next Appointment:   Future Appointments   Date Time Provider Adrianne Wyatt   10/25/2022 10:00 AM Stefania Kumar MD [facility] [place] - [place]       Message sent to [address] [place] [address] to schedule appt with patient? NO      Requested Prescriptions     Pending Prescriptions Disp Refills    metFORMIN (GLUCOPHAGE) 1000 MG tablet 180 tablet 1     Sig: Take 1 tablet (1,000 mg total) by mouth 2 times a day.

## 2022-10-19 RX ORDER — SERTRALINE HYDROCHLORIDE 100 MG/1
100 TABLET, FILM COATED ORAL DAILY
Qty: 180 TABLET | Refills: 1 | Status: SHIPPED | OUTPATIENT
Start: 2022-10-19

## 2022-10-19 RX ORDER — PRAVASTATIN SODIUM 40 MG
TABLET ORAL
Qty: 90 TABLET | Refills: 1 | Status: SHIPPED | OUTPATIENT
Start: 2022-10-19

## 2022-10-19 NOTE — TELEPHONE ENCOUNTER
.Refill Request     CONFIRM preferrred pharmacy with the patient. If Mail Order Rx - Pend for 90 day refill. Last Seen: Last Seen Department: 7/11/2022  Last Seen by PCP: Visit date not found    Last Written: 8-10-22 90 with 3     If no future appointment scheduled, route STAFF MESSAGE with patient name to the [facility] for scheduling. Next Appointment:   Future Appointments   Date Time Provider Adrianne Wyatt   10/25/2022 10:00 AM [facility]  [place] - [facility]       Message sent to [address] [place] [address] to schedule appt with patient? N/A      Requested Prescriptions     Pending Prescriptions Disp Refills    pravastatin (PRAVACHOL) 40 MG tablet 90 tablet 1     Sig: Take 1 tablet (40 mg total) by mouth daily.

## 2022-11-30 RX ORDER — BUSPIRONE HYDROCHLORIDE 15 MG/1
TABLET ORAL
Qty: 180 TABLET | Refills: 0 | Status: SHIPPED | OUTPATIENT
Start: 2022-11-30

## 2022-11-30 RX ORDER — SERTRALINE HYDROCHLORIDE 100 MG/1
100 TABLET, FILM COATED ORAL DAILY
Qty: 180 TABLET | Refills: 0 | Status: SHIPPED | OUTPATIENT
Start: 2022-11-30 | End: 2023-01-11 | Stop reason: SDUPTHER

## 2022-11-30 NOTE — TELEPHONE ENCOUNTER
Refill Request     CONFIRM preferrred pharmacy with the patient. If Mail Order Rx - Pend for 90 day refill. Last Seen: Last Seen Department: 7/11/2022  Last Seen by PCP: 7/11/2022    Last Written: 9/12/22 1 refill    If no future appointment scheduled, route STAFF MESSAGE with patient name to the [facility] for scheduling. Next Appointment:   Future Appointments   Date Time Provider Adrianne Wyatt   12/23/2022  9:00 AM Alex Nichole MD [facility] [place] - DYD       Message sent to [address] [place] [address] to schedule appt with patient? NO      Requested Prescriptions     Pending Prescriptions Disp Refills    busPIRone (BUSPAR) 15 MG tablet 180 tablet 1     Sig: Take 1 tablet (15 mg total) by mouth 2 times a day. metFORMIN (GLUCOPHAGE) 1000 MG tablet 180 tablet 1     Sig: Take 1 tablet (1,000 mg total) by mouth 2 times a day.

## 2022-11-30 NOTE — TELEPHONE ENCOUNTER
Refill Request - Controlled Substance    CONFIRM preferrred pharmacy with the patient. If Mail Order Rx - Pend for 90 day refill. Last Seen Department: 7/11/2022  Last Seen by PCP: Visit date not found    Last Written: 10/19/22    Last UDS:     Med Agreement Signed On:     If no future appointment scheduled, route STAFF MESSAGE with patient name to the [facility] for scheduling. CONFIRM preferrred pharmacy with the patient. Next Appointment:   Future Appointments   Date Time Provider Adrianne Wyatt   12/23/2022  9:00 AM MD ISH Huntley  [place] - [place]D       Message sent to [address] [place] [address] to schedule appt with patient?   NO      Requested Prescriptions     Pending Prescriptions Disp Refills    sertraline (ZOLOFT) 100 MG tablet 180 tablet 1     Sig: Take 1 tablet by mouth daily

## 2022-12-20 ENCOUNTER — PATIENT MESSAGE (OUTPATIENT)
Dept: FAMILY MEDICINE CLINIC | Age: 47
End: 2022-12-20

## 2022-12-20 NOTE — TELEPHONE ENCOUNTER
From: Rusty Winkler  To: Dr. Martha Yeager: 12/20/2022 12:00 PM EST  Subject: Mague Muir, I have had this stye since Sunday. The swelling is getting worse. I tried to schedule an evisit but it said there were none available. Is there anything I can do to help?

## 2022-12-21 ENCOUNTER — TELEPHONE (OUTPATIENT)
Dept: FAMILY MEDICINE CLINIC | Age: 47
End: 2022-12-21

## 2022-12-21 ENCOUNTER — NURSE ONLY (OUTPATIENT)
Dept: FAMILY MEDICINE CLINIC | Age: 47
End: 2022-12-21
Payer: COMMERCIAL

## 2022-12-21 DIAGNOSIS — E11.9 TYPE 2 DIABETES MELLITUS WITHOUT COMPLICATION, WITHOUT LONG-TERM CURRENT USE OF INSULIN (HCC): Primary | ICD-10-CM

## 2022-12-21 LAB — HBA1C MFR BLD: 6.8 %

## 2022-12-21 PROCEDURE — 83036 HEMOGLOBIN GLYCOSYLATED A1C: CPT | Performed by: FAMILY MEDICINE

## 2022-12-21 NOTE — TELEPHONE ENCOUNTER
Patient is scheduled for a VV DM f/u on 12/23/22. She will be coming in either today 12/21/22 or tomorrow 12/22/22 for a POCT a1c ok per reece.

## 2022-12-23 ENCOUNTER — TELEMEDICINE (OUTPATIENT)
Dept: FAMILY MEDICINE CLINIC | Age: 47
End: 2022-12-23

## 2022-12-23 DIAGNOSIS — F51.05 MOOD INSOMNIA (HCC): ICD-10-CM

## 2022-12-23 DIAGNOSIS — Z00.00 ROUTINE GENERAL MEDICAL EXAMINATION AT A HEALTH CARE FACILITY: ICD-10-CM

## 2022-12-23 DIAGNOSIS — E11.69 DM TYPE 2 WITH DIABETIC DYSLIPIDEMIA (HCC): ICD-10-CM

## 2022-12-23 DIAGNOSIS — H00.012 HORDEOLUM EXTERNUM OF RIGHT LOWER EYELID: ICD-10-CM

## 2022-12-23 DIAGNOSIS — E66.9 TYPE 2 DIABETES MELLITUS WITH OBESITY (HCC): Primary | ICD-10-CM

## 2022-12-23 DIAGNOSIS — E78.5 DM TYPE 2 WITH DIABETIC DYSLIPIDEMIA (HCC): ICD-10-CM

## 2022-12-23 DIAGNOSIS — F39 MOOD INSOMNIA (HCC): ICD-10-CM

## 2022-12-23 DIAGNOSIS — E11.69 TYPE 2 DIABETES MELLITUS WITH OBESITY (HCC): Primary | ICD-10-CM

## 2022-12-23 RX ORDER — TIRZEPATIDE 2.5 MG/.5ML
2.5 INJECTION, SOLUTION SUBCUTANEOUS WEEKLY
Qty: 2 ML | Refills: 0 | Status: SHIPPED | OUTPATIENT
Start: 2022-12-23 | End: 2023-01-20

## 2022-12-23 RX ORDER — HYDROXYZINE HYDROCHLORIDE 25 MG/1
25 TABLET, FILM COATED ORAL NIGHTLY
Qty: 90 TABLET | Refills: 3 | Status: SHIPPED | OUTPATIENT
Start: 2022-12-23 | End: 2023-01-22

## 2022-12-23 NOTE — PROGRESS NOTES
Lindsey Bynum (:  1975) is a Established patient, here for evaluation of the following:    Assessment & Plan   Below is the assessment and plan developed based on review of pertinent history, physical exam, labs, studies, and medications. 1. Type 2 diabetes mellitus with obesity (HCC)  -     Tirzepatide (MOUNJARO) 2.5 MG/0.5ML SOPN SC injection; Inject 0.5 mLs into the skin once a week for 28 days, Disp-2 mL, R-0Normal  Well controlled, but changing from daily GLP-1 to weekly. This should further help with weight loss as well. Continue metformin and Jardiance. 2. DM type 2 with diabetic dyslipidemia (HCC)  -     Tirzepatide (MOUNJARO) 2.5 MG/0.5ML SOPN SC injection; Inject 0.5 mLs into the skin once a week for 28 days, Disp-2 mL, R-0Normal  Labs reviewed from July in [facility] done at [facility], including lipids. Continue pravastatin. 3. Hordeolum externum of right lower eyelid  Continue warm compresses to right eye and local eye care discussed. Discussed antibiotics are usually not needed in this situation. Call or return to clinic prn if these symptoms worsen or fail to improve as anticipated. 4. Mood insomnia (HCC)  -     hydrOXYzine HCl (ATARAX) 25 MG tablet; Take 1 tablet by mouth nightly, Disp-90 tablet, R-3Normal  This problem is stable. Refills sent to pharmacy. Return in about 6 months (around 2023) for Annual physical, Diabetes. Subjective   HPI Lindsey Bynum is a 52 y.o. female here today for follow up on type 2 diabetes. She has no concerns. She is wondering if she can try a weekly injectable GLP-1 instead of daily Victoza. She also complains of a stye in the right eye has been there for 6 days. It was painful to touch, but is now improving after she has been doing warm compresses and using an over-the-counter eye drop meant for styes. She is taking hydroxyzine to help with sleep at night. She would like a refill of it.      Review of Systems - per HPI       Objective Patient-Reported Vitals  No data recorded     Physical Exam  [INSTRUCTIONS:  \"[x]\" Indicates a positive item  \"[]\" Indicates a negative item  -- DELETE ALL ITEMS NOT EXAMINED]    Constitutional: [x] Appears well-developed and well-nourished [x] No apparent distress      [] Abnormal -     Mental status: [x] Alert and awake  [] Oriented to person/place/time [x] Able to follow commands    [] Abnormal -     Eyes:   EOM    [x]  Normal    [] Abnormal -   Sclera  [x]  Normal    [] Abnormal -          Discharge [x]  None visible   [] Abnormal -     HENT: [x] Normocephalic, atraumatic  [] Abnormal -   [] Mouth/Throat: Mucous membranes are moist    External Ears [x] Normal  [] Abnormal -    Neck: [] No visualized mass [] Abnormal -     Pulmonary/Chest: [x] Respiratory effort normal   [x] No visualized signs of difficulty breathing or respiratory distress        [] Abnormal -      Musculoskeletal:   [] Normal gait with no signs of ataxia         [] Normal range of motion of neck        [] Abnormal -     Neurological:        [x] No Facial Asymmetry (Cranial nerve 7 motor function) (limited exam due to video visit)          [x] No gaze palsy        [] Abnormal -          Skin:        [x] No significant exanthematous lesions or discoloration noted on facial skin         [] Abnormal -            Psychiatric:       [] Normal Affect [] Abnormal -        [] No Hallucinations         Lab Review   Nurse Only on 12/21/2022   Component Date Value    Hemoglobin A1C 12/21/2022 6.8            Rebeca Brewster, was evaluated through a synchronous (real-time) audio-video encounter. The patient (or guardian if applicable) is aware that this is a billable service, which includes applicable co-pays. This Virtual Visit was conducted with patient's (and/or legal guardian's) consent.  The visit was conducted pursuant to the emergency declaration under the [facility] waiver authority and the Coronavirus Preparedness and Response Supplemental Appropriations Act. Patient identification was verified, and a caregiver was present when appropriate. The patient was located at Home: [address] [place] [address]. Provider was located at Home ([place] [address]): [place].         --David Vivas MD

## 2023-01-01 NOTE — TELEPHONE ENCOUNTER
Refill Request     Last Seen: Last Seen Department: 7/12/2021  Last Seen by PCP: 7/12/2021    Last Written: 2/6/2021    Next Appointment:   Future Appointments   Date Time Provider Adrianne Yoselin   10/25/2021 10:45 AM MD ISH Maloney  [place] - DYD       Future appointment scheduled      Requested Prescriptions     Pending Prescriptions Disp Refills    sertraline (ZOLOFT) 100 MG tablet [Pharmacy Med Name: sertraline 100 mg tablet (ZOLOFT)] 180 tablet 1     Sig: Take 2 tablets (200 mg total) by mouth daily
Statement Selected

## 2023-01-11 RX ORDER — SERTRALINE HYDROCHLORIDE 100 MG/1
100 TABLET, FILM COATED ORAL DAILY
Qty: 180 TABLET | Refills: 3 | Status: SHIPPED | OUTPATIENT
Start: 2023-01-11 | End: 2023-01-20

## 2023-01-11 NOTE — TELEPHONE ENCOUNTER
Refill Request     CONFIRM preferrred pharmacy with the patient. If Mail Order Rx - Pend for 90 day refill. Last Seen: Last Seen Department: 12/23/2022  Last Seen by PCP: 12/23/2022    Last Written: 11/30/2022 180 tablet 0 refills     If no future appointment scheduled, route STAFF MESSAGE with patient name to the [facility] for scheduling. Next Appointment:   Future Appointments   Date Time Provider Ardianne Wyatt   6/23/2023  9:00 AM MD ISH Yu  [place] - DYD       Message sent to [address] [place] [address] to schedule appt with patient?   NO      Requested Prescriptions     Pending Prescriptions Disp Refills    sertraline (ZOLOFT) 100 MG tablet 180 tablet 0     Sig: Take 1 tablet by mouth daily

## 2023-01-19 ENCOUNTER — PATIENT MESSAGE (OUTPATIENT)
Dept: FAMILY MEDICINE CLINIC | Age: 48
End: 2023-01-19

## 2023-01-20 ENCOUNTER — PATIENT MESSAGE (OUTPATIENT)
Dept: FAMILY MEDICINE CLINIC | Age: 48
End: 2023-01-20

## 2023-01-20 ENCOUNTER — TELEPHONE (OUTPATIENT)
Dept: FAMILY MEDICINE CLINIC | Age: 48
End: 2023-01-20

## 2023-01-20 RX ORDER — SERTRALINE HYDROCHLORIDE 100 MG/1
100 TABLET, FILM COATED ORAL 2 TIMES DAILY
Qty: 180 TABLET | Refills: 3 | Status: SHIPPED | OUTPATIENT
Start: 2023-01-20

## 2023-01-20 RX ORDER — SERTRALINE HYDROCHLORIDE 100 MG/1
100 TABLET, FILM COATED ORAL 2 TIMES DAILY
Qty: 180 TABLET | Refills: 3 | OUTPATIENT
Start: 2023-01-20

## 2023-01-20 NOTE — TELEPHONE ENCOUNTER
Patient called in regarding her Zoloft dosage. Stated she has been on the 100mg BID dosage for years and recently she's been getting 100mg once daily. Please advise on correct dosage. Thanks Dr. Erich Aquino!

## 2023-01-20 NOTE — TELEPHONE ENCOUNTER
From: Laina Sahni  Sent: 1/20/2023 2:41 PM EST  To: Henna Latif  Practice Support  Subject: Medication    Unfortunately it was sent to [facility] and I need it to go through the [address] for insurance purposes. I am calling [facility] now to cancel the order there. Please let me know when the RX has been sent to [address] [place] [address].

## 2023-01-20 NOTE — TELEPHONE ENCOUNTER
From: Angella Cerda  To: Dr. Green Peed: 2023 6:58 PM EST  Subject: Ap Ivey, I just used my last of the 2.5mg of Mounjaro. Can you please send a new script in for the 5mg? Im liking it so far although I havent noticed a change in my appetite, I have lost a few pounds! Thank you. Also I just submitted a second request for Zoloft as apparently it never got approved. Last time they only sent me 90 pills instead of 180 which is what I need for a 90 day supply. Thanks!

## 2023-01-21 RX ORDER — TIRZEPATIDE 5 MG/.5ML
5 INJECTION, SOLUTION SUBCUTANEOUS WEEKLY
Qty: 2 ADJUSTABLE DOSE PRE-FILLED PEN SYRINGE | Refills: 0 | Status: SHIPPED | OUTPATIENT
Start: 2023-01-21

## 2023-01-26 NOTE — TELEPHONE ENCOUNTER
Pt called in in regards to medication pharmacy issues. Pt stated that she would like for her mounjaro to please be sent in to [facility]. Pt stated that the only time a Rx should be sent to [facility] is if it is a same day antibiotic medicaiton. Authored by Resident/PA/NP

## 2023-01-27 RX ORDER — TIRZEPATIDE 5 MG/.5ML
5 INJECTION, SOLUTION SUBCUTANEOUS WEEKLY
Qty: 4 ADJUSTABLE DOSE PRE-FILLED PEN SYRINGE | Refills: 0 | Status: SHIPPED | OUTPATIENT
Start: 2023-01-27

## 2023-01-31 ENCOUNTER — TELEPHONE (OUTPATIENT)
Dept: FAMILY MEDICINE CLINIC | Age: 48
End: 2023-01-31

## 2023-02-01 ENCOUNTER — PATIENT MESSAGE (OUTPATIENT)
Dept: FAMILY MEDICINE CLINIC | Age: 48
End: 2023-02-01

## 2023-02-01 NOTE — TELEPHONE ENCOUNTER
Submitted PA for [facility] 5MG/0.5ML pen-injectors, Key: [id number] . Status PENDING.

## 2023-02-09 LAB — PAP SMEAR, EXTERNAL: NEGATIVE

## 2023-02-21 RX ORDER — PANTOPRAZOLE SODIUM 20 MG/1
TABLET, DELAYED RELEASE ORAL
Qty: 90 TABLET | Refills: 1 | Status: SHIPPED | OUTPATIENT
Start: 2023-02-21

## 2023-02-21 NOTE — TELEPHONE ENCOUNTER
Refill Request     CONFIRM preferrred pharmacy with the patient. If Mail Order Rx - Pend for 90 day refill. Last Seen: Last Seen Department: 12/23/2022  Last Seen by PCP: Visit date not found    Last Written: 09/06/2022 90 tablet  1 refills     If no future appointment scheduled, route STAFF MESSAGE with patient name to the [facility] for scheduling. Next Appointment:   Future Appointments   Date Time Provider Adrianne Wyatt   6/23/2023  9:00 AM Matilda Najjar, MD [facility] [place] - [place]YD       Message sent to [address] [place] [address] to schedule appt with patient? NO      Requested Prescriptions     Pending Prescriptions Disp Refills    pantoprazole (PROTONIX) 20 MG tablet [Pharmacy Med Name: pantoprazole 20 mg tablet,delayed release (PROTONIX)] 90 tablet 1     Sig: Take 1 tablet (20 mg total) by mouth daily.

## 2023-02-28 ENCOUNTER — TELEPHONE (OUTPATIENT)
Dept: FAMILY MEDICINE CLINIC | Age: 48
End: 2023-02-28

## 2023-02-28 NOTE — TELEPHONE ENCOUNTER
[facility] called in, states they do not have the 7.5 of the [facility] in stock, they only 5 and 1. They cannot split it due to it will charge her over $1000 for it.  Please advise

## 2023-02-28 NOTE — TELEPHONE ENCOUNTER
I called [employer] and spoke to Jimbo Dinora She states the [facility] has been APPROVED. She did say an approval letter was faxed to [phone]. I am not sure what that fax number is to or how she received it. I will scan approval letter as soon as I receive it. Please notify patient. Thank you.

## 2023-03-01 RX ORDER — TIRZEPATIDE 5 MG/.5ML
5 INJECTION, SOLUTION SUBCUTANEOUS WEEKLY
Qty: 4 ADJUSTABLE DOSE PRE-FILLED PEN SYRINGE | Refills: 0 | Status: SHIPPED | OUTPATIENT
Start: 2023-03-01

## 2023-03-01 NOTE — TELEPHONE ENCOUNTER
Please let patient know her pharmacy does not have the next dose up of 7.5 mg, so I sent in the 5 mg again for now. We can try again next month to see if they get the 7.5 mg in stock by then.

## 2023-03-17 DIAGNOSIS — E66.9 TYPE 2 DIABETES MELLITUS WITH OBESITY (HCC): Primary | ICD-10-CM

## 2023-03-17 DIAGNOSIS — E11.69 TYPE 2 DIABETES MELLITUS WITH OBESITY (HCC): Primary | ICD-10-CM

## 2023-03-27 ENCOUNTER — TELEPHONE (OUTPATIENT)
Dept: FAMILY MEDICINE CLINIC | Age: 48
End: 2023-03-27

## 2023-03-27 RX ORDER — TIRZEPATIDE 7.5 MG/.5ML
7.5 INJECTION, SOLUTION SUBCUTANEOUS WEEKLY
Qty: 2 ML | Refills: 0 | Status: SHIPPED | OUTPATIENT
Start: 2023-03-27 | End: 2023-04-24

## 2023-03-27 NOTE — TELEPHONE ENCOUNTER
Regarding: Cindy Manrique! I have one more dose left of Mounjaro. Can you please submit a refill for this and if available, I'd like to move up to 7.5mg. I believe the PA has already been received for that but the pharmacy didn't have it in stock last month so I stayed on the 5mg dose. Please confirm. Thanks!

## 2023-03-28 RX ORDER — TIRZEPATIDE 5 MG/.5ML
INJECTION, SOLUTION SUBCUTANEOUS
Qty: 2 ML | Refills: 0 | OUTPATIENT
Start: 2023-03-28

## 2023-03-29 NOTE — TELEPHONE ENCOUNTER
Patient called in asking if we can send in the 5mg mounjaro due to the pharmacy not having the 7.5mg in stock please send to [address]

## 2023-03-30 RX ORDER — TIRZEPATIDE 5 MG/.5ML
5 INJECTION, SOLUTION SUBCUTANEOUS WEEKLY
Qty: 4 ADJUSTABLE DOSE PRE-FILLED PEN SYRINGE | Refills: 0 | Status: SHIPPED | OUTPATIENT
Start: 2023-03-30

## 2023-04-04 RX ORDER — SERTRALINE HYDROCHLORIDE 100 MG/1
TABLET, FILM COATED ORAL
Qty: 180 TABLET | Refills: 1 | Status: SHIPPED | OUTPATIENT
Start: 2023-04-04

## 2023-04-04 NOTE — TELEPHONE ENCOUNTER
.Refill Request     CONFIRM preferred pharmacy with the patient. If Mail Order Rx - Pend for 90 day refill. Last Seen: Last Seen Department: 12/23/2022  Last Seen by PCP: 12/23/2022    Last Written: 1/20/23 180 with 3     If no future appointment scheduled:  Review the last OV with PCP and review information for follow-up visit,  Route STAFF MESSAGE with patient name to the [facility] for scheduling with the following information:            -  Timing of next visit           -  Visit type ie Physical, OV, etc           -  Diagnoses/Reason ie. COPD, HTN - Do not use MEDICATION, Follow-up or CHECK UP - Give reason for visit      Next Appointment:   Future Appointments   Date Time Provider Adrianne Wyatt   6/23/2023  9:00 AM MD ISH Del Castillo  [place] - DYFANNY       Message sent to [address] [place] [address] to schedule appt with patient? N/A      Requested Prescriptions     Pending Prescriptions Disp Refills    sertraline (ZOLOFT) 100 MG tablet [Pharmacy Med Name: sertraline 100 mg tablet (ZOLOFT)] 180 tablet 1     Sig: Take 2 tablets (200 mg total) by mouth daily.

## 2023-05-15 DIAGNOSIS — E11.69 TYPE 2 DIABETES MELLITUS WITH OBESITY (HCC): Primary | ICD-10-CM

## 2023-05-15 DIAGNOSIS — E66.9 TYPE 2 DIABETES MELLITUS WITH OBESITY (HCC): Primary | ICD-10-CM

## 2023-05-15 DIAGNOSIS — E11.65 UNCONTROLLED TYPE 2 DIABETES MELLITUS WITH HYPERGLYCEMIA (HCC): ICD-10-CM

## 2023-05-16 RX ORDER — TIRZEPATIDE 7.5 MG/.5ML
7.5 INJECTION, SOLUTION SUBCUTANEOUS WEEKLY
Qty: 2 ML | Refills: 1 | Status: SHIPPED | OUTPATIENT
Start: 2023-05-16 | End: 2023-06-29

## 2023-06-23 ENCOUNTER — OFFICE VISIT (OUTPATIENT)
Dept: FAMILY MEDICINE CLINIC | Age: 48
End: 2023-06-23
Payer: COMMERCIAL

## 2023-06-23 VITALS
WEIGHT: 138 LBS | DIASTOLIC BLOOD PRESSURE: 70 MMHG | HEART RATE: 94 BPM | SYSTOLIC BLOOD PRESSURE: 112 MMHG | OXYGEN SATURATION: 98 % | BODY MASS INDEX: 25.24 KG/M2

## 2023-06-23 DIAGNOSIS — Z00.00 ROUTINE GENERAL MEDICAL EXAMINATION AT A HEALTH CARE FACILITY: ICD-10-CM

## 2023-06-23 DIAGNOSIS — E78.5 DM TYPE 2 WITH DIABETIC DYSLIPIDEMIA (HCC): Primary | ICD-10-CM

## 2023-06-23 DIAGNOSIS — E11.69 DM TYPE 2 WITH DIABETIC DYSLIPIDEMIA (HCC): Primary | ICD-10-CM

## 2023-06-23 DIAGNOSIS — H69.83 ETD (EUSTACHIAN TUBE DYSFUNCTION), BILATERAL: ICD-10-CM

## 2023-06-23 DIAGNOSIS — M54.31 BACK PAIN WITH RIGHT-SIDED SCIATICA: ICD-10-CM

## 2023-06-23 DIAGNOSIS — B37.0 THRUSH: ICD-10-CM

## 2023-06-23 LAB
CREATININE URINE POCT: 100
HBA1C MFR BLD: 5.8 %
MICROALBUMIN/CREAT 24H UR: 10 MG/G{CREAT}
MICROALBUMIN/CREAT UR-RTO: <30

## 2023-06-23 PROCEDURE — 99214 OFFICE O/P EST MOD 30 MIN: CPT | Performed by: FAMILY MEDICINE

## 2023-06-23 PROCEDURE — 83037 HB GLYCOSYLATED A1C HOME DEV: CPT | Performed by: FAMILY MEDICINE

## 2023-06-23 PROCEDURE — 82044 UR ALBUMIN SEMIQUANTITATIVE: CPT | Performed by: FAMILY MEDICINE

## 2023-06-23 PROCEDURE — 3044F HG A1C LEVEL LT 7.0%: CPT | Performed by: FAMILY MEDICINE

## 2023-06-23 RX ORDER — FLUCONAZOLE 150 MG/1
150 TABLET ORAL DAILY
Qty: 5 TABLET | Refills: 0 | Status: SHIPPED | OUTPATIENT
Start: 2023-06-23

## 2023-06-23 RX ORDER — UBIQUINOL 100 MG
CAPSULE ORAL
COMMUNITY

## 2023-06-23 RX ORDER — METFORMIN HYDROCHLORIDE 500 MG/1
500 TABLET, EXTENDED RELEASE ORAL
Qty: 90 TABLET | Refills: 1 | Status: SHIPPED | OUTPATIENT
Start: 2023-06-23

## 2023-06-23 ASSESSMENT — PATIENT HEALTH QUESTIONNAIRE - PHQ9
SUM OF ALL RESPONSES TO PHQ QUESTIONS 1-9: 1
SUM OF ALL RESPONSES TO PHQ QUESTIONS 1-9: 1
1. LITTLE INTEREST OR PLEASURE IN DOING THINGS: 0
SUM OF ALL RESPONSES TO PHQ QUESTIONS 1-9: 1
SUM OF ALL RESPONSES TO PHQ9 QUESTIONS 1 & 2: 1
2. FEELING DOWN, DEPRESSED OR HOPELESS: 1
SUM OF ALL RESPONSES TO PHQ QUESTIONS 1-9: 1

## 2023-06-26 PROBLEM — E66.9 TYPE 2 DIABETES MELLITUS WITH OBESITY (HCC): Status: RESOLVED | Noted: 2022-12-23 | Resolved: 2023-06-26

## 2023-06-26 PROBLEM — E11.69 TYPE 2 DIABETES MELLITUS WITH OBESITY (HCC): Status: RESOLVED | Noted: 2022-12-23 | Resolved: 2023-06-26

## 2023-06-26 ASSESSMENT — ENCOUNTER SYMPTOMS
BOWEL INCONTINENCE: 0
RHINORRHEA: 0
SORE THROAT: 0
COUGH: 0
BACK PAIN: 1

## 2023-06-29 DIAGNOSIS — E11.65 UNCONTROLLED TYPE 2 DIABETES MELLITUS WITH HYPERGLYCEMIA (HCC): ICD-10-CM

## 2023-06-29 RX ORDER — TIRZEPATIDE 7.5 MG/.5ML
7.5 INJECTION, SOLUTION SUBCUTANEOUS WEEKLY
Qty: 2 ML | Refills: 1 | Status: SHIPPED | OUTPATIENT
Start: 2023-06-29 | End: 2023-08-07

## 2023-06-29 RX ORDER — BUDESONIDE AND FORMOTEROL FUMARATE DIHYDRATE 160; 4.5 UG/1; UG/1
AEROSOL RESPIRATORY (INHALATION)
Qty: 3 EACH | Refills: 2 | Status: SHIPPED | OUTPATIENT
Start: 2023-06-29

## 2023-06-29 RX ORDER — BUSPIRONE HYDROCHLORIDE 15 MG/1
TABLET ORAL
Qty: 180 TABLET | Refills: 0 | Status: SHIPPED | OUTPATIENT
Start: 2023-06-29

## 2023-06-29 NOTE — TELEPHONE ENCOUNTER
.Refill Request     CONFIRM preferred pharmacy with the patient. If Mail Order Rx - Pend for 90 day refill. Last Seen: Last Seen Department: 6/23/2023  Last Seen by PCP: 6/23/2023    Last Written: 11-30-22 180 with 0     If no future appointment scheduled:  Review the last OV with PCP and review information for follow-up visit,  Route STAFF MESSAGE with patient name to the [facility] for scheduling with the following information:            -  Timing of next visit           -  Visit type ie Physical, OV, etc           -  Diagnoses/Reason ie. COPD, HTN - Do not use MEDICATION, Follow-up or CHECK UP - Give reason for visit      Next Appointment:   Future Appointments   Date Time Provider [facility] [address]   12/28/2023  9:00 AM MD ISH Hutchison  [place]       Message sent to [address] [place] [address] to schedule appt with patient? N/A      Requested Prescriptions     Pending Prescriptions Disp Refills    busPIRone (BUSPAR) 15 MG tablet [Pharmacy Med Name: busPIRone 15 mg tablet (BUSPAR)] 180 tablet 0     Sig: Take 1 tablet (15 mg total) by mouth 2 times a day.     MOUNJARO 7.5 MG/0.5ML SOPN SC injection [Pharmacy Med Name: Mounjaro 7.5 mg/0.5 mL subcutaneous pen injector (tirzepatide)] 2 mL 1     Sig: Inject 0.5 mLs into the skin once a week for 28 days

## 2023-07-28 ENCOUNTER — PATIENT MESSAGE (OUTPATIENT)
Dept: FAMILY MEDICINE CLINIC | Age: 48
End: 2023-07-28

## 2023-07-28 NOTE — TELEPHONE ENCOUNTER
From: Annette   To: Dr. Gabrielle Hooper: 7/28/2023 1:21 PM EDT  Subject: Solo Fine dose    Dr. Ernesto Jefferson, I would like to bump up to 10mg of Mounjaro instead of 7.5. Perhaps it is because I eliminated Jardiance and reduced Metformin, but I feel as if the 7.5 isn't offering the same level of appetite control and reduced cravings. I haven't gained any weight but also haven't lost any more and I'd still like to lose about 10 - 15 more pounds. I know the prior authorization needs to be completed before the fill can be placed so I wanted to make sure to get ahead of this as I will need a refill in 2 weeks. I would also like to request a prescription for Zofran or something similar to help with the extreme nausea that sometimes occurs as I suspect it may get worse with an increase. Both prescriptions can be sent to [facility]. Thank you!

## 2023-08-07 ENCOUNTER — TELEPHONE (OUTPATIENT)
Dept: FAMILY MEDICINE CLINIC | Age: 48
End: 2023-08-07

## 2023-08-07 RX ORDER — ONDANSETRON 4 MG/1
4 TABLET, ORALLY DISINTEGRATING ORAL 3 TIMES DAILY PRN
Qty: 21 TABLET | Refills: 0 | Status: SHIPPED | OUTPATIENT
Start: 2023-08-07

## 2023-08-07 RX ORDER — TIRZEPATIDE 10 MG/.5ML
10 INJECTION, SOLUTION SUBCUTANEOUS WEEKLY
Qty: 2 ML | Refills: 2 | Status: SHIPPED | OUTPATIENT
Start: 2023-08-07

## 2023-08-07 NOTE — TELEPHONE ENCOUNTER
Dr. Saman Landaverde, I would like to bump up to 10mg of Mounjaro instead of 7.5. Perhaps it is because I eliminated Jardiance and reduced Metformin, but I feel as if the 7.5 isn't offering the same level of appetite control and reduced cravings. I haven't gained any weight but also haven't lost any more and I'd still like to lose about 10 - 15 more pounds. I know the prior authorization needs to be completed before the fill can be placed so I wanted to make sure to get ahead of this as I will need a refill in 2 weeks. I would also like to request a prescription for Zofran or something similar to help with the extreme nausea that sometimes occurs as I suspect it may get worse with an increase. Both prescriptions can be sent to [facility]. Thank you!

## 2023-08-08 ENCOUNTER — TELEPHONE (OUTPATIENT)
Dept: FAMILY MEDICINE CLINIC | Age: 48
End: 2023-08-08

## 2023-08-08 NOTE — TELEPHONE ENCOUNTER
know the prior authorization needs to be completed before the fill can be placed so I wanted to make sure to get ahead of this as I will need a refill in 2 weeks. I would also like to request a prescription for Zofran or something similar to help with the extreme nausea that sometimes occurs as I suspect it may get worse with an increase. Both prescriptions can be sent to [address] [place] [address]. Thank you!

## 2023-08-10 RX ORDER — PRAVASTATIN SODIUM 40 MG
TABLET ORAL
Qty: 90 TABLET | Refills: 1 | Status: SHIPPED | OUTPATIENT
Start: 2023-08-10

## 2023-08-10 RX ORDER — PANTOPRAZOLE SODIUM 20 MG/1
TABLET, DELAYED RELEASE ORAL
Qty: 90 TABLET | Refills: 1 | Status: SHIPPED | OUTPATIENT
Start: 2023-08-10

## 2023-08-10 NOTE — TELEPHONE ENCOUNTER
Refill Request     CONFIRM preferred pharmacy with the patient. If Mail Order Rx - Pend for 90 day refill. Last Seen: Last Seen Department: 6/23/2023    Last Seen by PCP: 6/23/23    Last Written: 10/19/22 90 tablet 1 refill    If no future appointment scheduled:  Review the last OV with PCP and review information for follow-up visit,  Route STAFF MESSAGE with patient name to the [facility] for scheduling with the following information:            -  Timing of next visit           -  Visit type ie Physical, OV, etc           -  Diagnoses/Reason ie. COPD, HTN - Do not use MEDICATION, Follow-up or CHECK UP - Give reason for visit      Next Appointment: 12/28/23  Future Appointments   Date Time Provider [facility] [address]   12/28/2023  9:00 AM [facility]       Message sent to [address] [place] [address] to schedule appt with patient? NO      Requested Prescriptions     Pending Prescriptions Disp Refills    pravastatin (PRAVACHOL) 40 MG tablet 90 tablet 1     Sig: Take 1 tablet (40 mg total) by mouth daily.

## 2023-08-10 NOTE — TELEPHONE ENCOUNTER
.Refill Request     CONFIRM preferred pharmacy with the patient. If Mail Order Rx - Pend for 90 day refill. Last Seen: Last Seen Department: 6/23/2023  Last Seen by PCP: Visit date not found    Last Written: 2-21-23 90 with 1     If no future appointment scheduled:  Review the last OV with PCP and review information for follow-up visit,  Route STAFF MESSAGE with patient name to the [facility] for scheduling with the following information:            -  Timing of next visit           -  Visit type ie Physical, OV, etc           -  Diagnoses/Reason ie. COPD, HTN - Do not use MEDICATION, Follow-up or CHECK UP - Give reason for visit      Next Appointment:   Future Appointments   Date Time Provider [address]   12/28/2023  9:00 AM MD ISH Peres  [place] - SHANTHI       Message sent to [address] [place] [address] to schedule appt with patient?   N/A      Requested Prescriptions     Pending Prescriptions Disp Refills    pantoprazole (PROTONIX) 20 MG tablet 90 tablet 1     Sig: TAKE ONE TAB BY MOUTH DAILY

## 2023-08-23 ENCOUNTER — PATIENT MESSAGE (OUTPATIENT)
Dept: FAMILY MEDICINE CLINIC | Age: 48
End: 2023-08-23

## 2023-08-28 ENCOUNTER — TELEPHONE (OUTPATIENT)
Dept: FAMILY MEDICINE CLINIC | Age: 48
End: 2023-08-28

## 2023-08-28 NOTE — TELEPHONE ENCOUNTER
----- Message from Kelton Vaughn sent at 8/24/2023  7:49 AM EDT -----  Regarding: FW: Letter for airport security   Contact: [phone]        ----- Message -----  From: Tiana Berg  Sent: 8/23/2023   8:04 PM EDT  To: Henna Mcnulty Practice Support  Subject: Letter for airport security                      Dr. Domo Rehman, I am going to [facility] in a couple weeks and will need to take a dose of Mounjaro in my carry on bag. Would you please write a letter stating I need it for my diabetes? My travel dates are 9/2 - 9/12. Thank you!

## 2023-08-30 NOTE — TELEPHONE ENCOUNTER
Spoke with the patient, notified her that the letter was email. She has not received the letter via email. Asked that she check under her mychart for the letter, she was able to obtain it there.

## 2023-09-01 DIAGNOSIS — E78.5 DM TYPE 2 WITH DIABETIC DYSLIPIDEMIA (HCC): ICD-10-CM

## 2023-09-01 DIAGNOSIS — E11.69 DM TYPE 2 WITH DIABETIC DYSLIPIDEMIA (HCC): ICD-10-CM

## 2023-09-03 NOTE — TELEPHONE ENCOUNTER
.Refill Request     CONFIRM preferred pharmacy with the patient. If Mail Order Rx - Pend for 90 day refill. Last Seen: Last Seen Department: 6/23/2023  Last Seen by PCP: 6/23/2023    Last Written: 6/23/23 90 with 1     If no future appointment scheduled:  Review the last OV with PCP and review information for follow-up visit,  Route STAFF MESSAGE with patient name to the [facility] for scheduling with the following information:            -  Timing of next visit           -  Visit type ie Physical, OV, etc           -  Diagnoses/Reason ie. COPD, HTN - Do not use MEDICATION, Follow-up or CHECK UP - Give reason for visit      Next Appointment:   Future Appointments   Date Time Provider [facility] [address]   12/28/2023  9:00 AM MD ISH Hutchison  [employer] SHANTHI       Message sent to [address] [place] [address] to schedule appt with patient?   N/A      Requested Prescriptions     Pending Prescriptions Disp Refills    metFORMIN (GLUCOPHAGE-XR) 500 MG extended release tablet 90 tablet 1     Sig: Take 1 tablet by mouth daily (with breakfast)

## 2023-09-04 RX ORDER — METFORMIN HYDROCHLORIDE 500 MG/1
500 TABLET, EXTENDED RELEASE ORAL
Qty: 90 TABLET | Refills: 1 | Status: SHIPPED | OUTPATIENT
Start: 2023-09-04

## 2023-10-02 ENCOUNTER — TELEPHONE (OUTPATIENT)
Dept: FAMILY MEDICINE CLINIC | Age: 48
End: 2023-10-02

## 2023-10-02 NOTE — TELEPHONE ENCOUNTER
Refill Request     CONFIRM preferred pharmacy with the patient. If Mail Order Rx - Pend for 90 day refill. Last Seen: Last Seen Department: 6/23/2023  Last Seen by PCP: 6/23/2023    Last Written: 08/07/23 2ml with 2 refill    If no future appointment scheduled:  Review the last OV with PCP and review information for follow-up visit,  Route STAFF MESSAGE with patient name to the [facility] for scheduling with the following information:            -  Timing of next visit           -  Visit type ie Physical, OV, etc           -  Diagnoses/Reason ie. COPD, HTN - Do not use MEDICATION, Follow-up or CHECK UP - Give reason for visit      Next Appointment:   Future Appointments   Date Time Provider [address] [place] [address]   12/28/2023  9:00 AM MD ISH Pritchett  [place]       Message sent to [address] [place] [address] to schedule appt with patient?   NO      Requested Prescriptions     Pending Prescriptions Disp Refills    Tirzepatide (MOUNJARO) 10 MG/0.5ML SOPN SC injection 2 mL 2     Sig: Inject 0.5 mLs into the skin once a week

## 2023-10-02 NOTE — TELEPHONE ENCOUNTER
New or worsening symptoms    Spoke with: Cristel  Patient call back number- [phone]    [address]    Have you sought care at Urgent Care or ED?   no    Symptoms reported - pea size lump on left side of neck (hard to the touch and doesn't move), occasional pain that radiates up into her neck    Symptom onset has been chronic for a time period of 3 month(s). Severity is described as mild. Course of her symptoms over time is the same.     Does anything make the symptom(s) better or worse?- yes - movement makes the pain worse    Have you taken anything (prescriptions or OTC) to help with symptom(s), did it help? no     Call Outcome/Determination of next steps for patient- message sent to Dr. Dr. Eric Clemons

## 2023-10-02 NOTE — TELEPHONE ENCOUNTER
.Refill Request     CONFIRM preferred pharmacy with the patient. If Mail Order Rx - Pend for 90 day refill. Last Seen: Last Seen Department: 6/23/2023  Last Seen by PCP: 6/23/2023    Last Written: 8-7-23 2 ml with 2     If no future appointment scheduled:  Review the last OV with PCP and review information for follow-up visit,  Route STAFF MESSAGE with patient name to the [facility] for scheduling with the following information:            -  Timing of next visit           -  Visit type ie Physical, OV, etc           -  Diagnoses/Reason ie. COPD, HTN - Do not use MEDICATION, Follow-up or CHECK UP - Give reason for visit      Next Appointment:   Future Appointments   Date Time Provider [address] [place] [address]   12/28/2023  9:00 [facility]  [employer] [facility]       Message sent to [address] [place] [address] to schedule appt with patient?   N/A      Requested Prescriptions     Pending Prescriptions Disp Refills    Tirzepatide (MOUNJARO) 10 MG/0.5ML SOPN SC injection 2 mL 2     Sig: Inject 0.5 mLs into the skin once a week

## 2023-10-03 RX ORDER — TIRZEPATIDE 10 MG/.5ML
10 INJECTION, SOLUTION SUBCUTANEOUS WEEKLY
Qty: 2 ML | Refills: 2 | Status: SHIPPED | OUTPATIENT
Start: 2023-10-03

## 2023-10-04 NOTE — TELEPHONE ENCOUNTER
Refill Request     CONFIRM preferred pharmacy with the patient. If Mail Order Rx - Pend for 90 day refill. Last Seen: Last Seen Department: 6/23/2023  Last Seen by PCP: 6/23/2023    Last Written: 6/29/23 180 with no refills     4/4/23 180 with 1 refill     If no future appointment scheduled:  Review the last OV with PCP and review information for follow-up visit,  Route STAFF MESSAGE with patient name to the [facility] for scheduling with the following information:            -  Timing of next visit           -  Visit type ie Physical, OV, etc           -  Diagnoses/Reason ie. COPD, HTN - Do not use MEDICATION, Follow-up or CHECK UP - Give reason for visit      Next Appointment:   Future Appointments   Date Time Provider [address] [place] [address]   10/10/2023  1:30 PM MD ISH Cowart  [facility] - SHANTHI   12/28/2023  9:00 AM MD ISH Cowart  [place] - [place]FANNY       Message sent to [address] [place] [address] to schedule appt with patient?   NO      Requested Prescriptions     Pending Prescriptions Disp Refills    sertraline (ZOLOFT) 100 MG tablet 180 tablet 1    busPIRone (BUSPAR) 15 MG tablet 180 tablet 0

## 2023-10-05 RX ORDER — SERTRALINE HYDROCHLORIDE 100 MG/1
TABLET, FILM COATED ORAL
Qty: 180 TABLET | Refills: 1 | Status: SHIPPED | OUTPATIENT
Start: 2023-10-05

## 2023-10-05 RX ORDER — BUSPIRONE HYDROCHLORIDE 15 MG/1
TABLET ORAL
Qty: 180 TABLET | Refills: 1 | Status: SHIPPED | OUTPATIENT
Start: 2023-10-05

## 2023-10-09 SDOH — ECONOMIC STABILITY: FOOD INSECURITY: WITHIN THE PAST 12 MONTHS, THE FOOD YOU BOUGHT JUST DIDN'T LAST AND YOU DIDN'T HAVE MONEY TO GET MORE.: NEVER TRUE

## 2023-10-09 SDOH — ECONOMIC STABILITY: FOOD INSECURITY: WITHIN THE PAST 12 MONTHS, YOU WORRIED THAT YOUR FOOD WOULD RUN OUT BEFORE YOU GOT MONEY TO BUY MORE.: NEVER TRUE

## 2023-10-09 SDOH — ECONOMIC STABILITY: INCOME INSECURITY: HOW HARD IS IT FOR YOU TO PAY FOR THE VERY BASICS LIKE FOOD, HOUSING, MEDICAL CARE, AND HEATING?: NOT HARD AT ALL

## 2023-10-10 ENCOUNTER — OFFICE VISIT (OUTPATIENT)
Dept: FAMILY MEDICINE CLINIC | Age: 48
End: 2023-10-10
Payer: COMMERCIAL

## 2023-10-10 VITALS
SYSTOLIC BLOOD PRESSURE: 112 MMHG | HEART RATE: 63 BPM | DIASTOLIC BLOOD PRESSURE: 70 MMHG | BODY MASS INDEX: 24.33 KG/M2 | WEIGHT: 133 LBS | OXYGEN SATURATION: 99 %

## 2023-10-10 DIAGNOSIS — M54.2 NECK PAIN ON LEFT SIDE: ICD-10-CM

## 2023-10-10 DIAGNOSIS — R59.1 LYMPHADENOPATHY: ICD-10-CM

## 2023-10-10 DIAGNOSIS — M62.838 NECK MUSCLE SPASM: Primary | ICD-10-CM

## 2023-10-10 PROCEDURE — 99213 OFFICE O/P EST LOW 20 MIN: CPT | Performed by: FAMILY MEDICINE

## 2023-10-10 RX ORDER — METHOCARBAMOL 500 MG/1
500 TABLET, FILM COATED ORAL 4 TIMES DAILY
Qty: 40 TABLET | Refills: 0 | Status: SHIPPED | OUTPATIENT
Start: 2023-10-10 | End: 2023-10-20

## 2023-10-10 RX ORDER — PREDNISONE 10 MG/1
TABLET ORAL
Qty: 42 TABLET | Refills: 0 | Status: SHIPPED | OUTPATIENT
Start: 2023-10-10 | End: 2023-10-20

## 2023-10-10 RX ORDER — ONDANSETRON 4 MG/1
4 TABLET, FILM COATED ORAL EVERY 8 HOURS PRN
COMMUNITY
End: 2023-10-10 | Stop reason: SDUPTHER

## 2023-10-10 RX ORDER — PREDNISONE 10 MG/1
TABLET ORAL
Qty: 42 TABLET | Refills: 0 | Status: SHIPPED | OUTPATIENT
Start: 2023-10-10 | End: 2023-10-10

## 2023-10-10 RX ORDER — METHOCARBAMOL 500 MG/1
500 TABLET, FILM COATED ORAL 4 TIMES DAILY
Qty: 40 TABLET | Refills: 0 | Status: SHIPPED | OUTPATIENT
Start: 2023-10-10 | End: 2023-10-10

## 2023-10-10 RX ORDER — ONDANSETRON 4 MG/1
4 TABLET, FILM COATED ORAL EVERY 8 HOURS PRN
Qty: 30 TABLET | Refills: 5 | Status: SHIPPED | OUTPATIENT
Start: 2023-10-10 | End: 2023-12-09

## 2023-10-10 NOTE — PROGRESS NOTES
Cuff Size: Small Adult   Pulse: 63   SpO2: 99%   Weight: 133 lb (60.3 kg)      Physical Exam  Vitals and nursing note reviewed. Constitutional:       Appearance: Normal appearance. She is normal weight. Pulmonary:      Effort: Pulmonary effort is normal.   Musculoskeletal:      Cervical back: Spasms present. No swelling, edema, deformity, rigidity, torticollis, bony tenderness or crepitus. Pain with movement present. Normal range of motion. Lymphadenopathy:      Head:      Right side of head: No submental, submandibular, tonsillar, preauricular, posterior auricular or occipital adenopathy. Left side of head: Occipital (sub-centimeter single slightly enlarged lymph node) adenopathy present. No submental, submandibular, tonsillar, preauricular or posterior auricular adenopathy. Cervical: No cervical adenopathy. Neurological:      Mental Status: She is alert. Sensory: Sensation is intact. Motor: Motor function is intact. Comments: Spruling's test negative bilaterally                  An electronic signature was used to authenticate this note.     --Socorro Santos MD

## 2023-10-11 ASSESSMENT — ENCOUNTER SYMPTOMS: NAUSEA: 1

## 2023-10-17 ENCOUNTER — TELEPHONE (OUTPATIENT)
Dept: FAMILY MEDICINE CLINIC | Age: 48
End: 2023-10-17

## 2023-10-17 DIAGNOSIS — M54.2 NECK PAIN ON LEFT SIDE: Primary | ICD-10-CM

## 2023-10-17 NOTE — TELEPHONE ENCOUNTER
Received phone call from the pt in regards to she was seen on 10/10/2023 for neck pain she stated that she was prescribed steroid and muscle relaxer however it is still causing her issues and painful she is asking for further testing/imaging to be placed please advise she also stated that if there is any orders that need to be placed she gets all her imaging for free through [facility] please advise.

## 2023-10-18 ENCOUNTER — PATIENT MESSAGE (OUTPATIENT)
Dept: FAMILY MEDICINE CLINIC | Age: 48
End: 2023-10-18

## 2023-10-18 NOTE — TELEPHONE ENCOUNTER
We should start with x-rays. I have ordered them for her. Please fax to  so she can have them done there.

## 2023-10-27 ENCOUNTER — TELEPHONE (OUTPATIENT)
Dept: FAMILY MEDICINE CLINIC | Age: 48
End: 2023-10-27

## 2023-10-27 ENCOUNTER — PATIENT MESSAGE (OUTPATIENT)
Dept: FAMILY MEDICINE CLINIC | Age: 48
End: 2023-10-27

## 2023-10-27 NOTE — TELEPHONE ENCOUNTER
----- Message from Tana Boston sent at 10/27/2023  8:37 AM EDT -----  Regarding: Hyacinth Alva: [phone]  Dr. Ramy Flanagan, can you please increase my Mounjaro to 12.5 please and send a new RX?      Thanks in advance,  Mariia Yoder

## 2023-10-29 RX ORDER — TIRZEPATIDE 12.5 MG/.5ML
12.5 INJECTION, SOLUTION SUBCUTANEOUS WEEKLY
Qty: 2 ML | Refills: 2 | Status: SHIPPED | OUTPATIENT
Start: 2023-10-29

## 2023-12-26 ENCOUNTER — TELEPHONE (OUTPATIENT)
Dept: FAMILY MEDICINE CLINIC | Age: 48
End: 2023-12-26

## 2023-12-26 NOTE — TELEPHONE ENCOUNTER
I would like to see her first and discuss what happened, then decide upon labs that need ordered (per office policy).

## 2023-12-27 DIAGNOSIS — Z00.00 ROUTINE GENERAL MEDICAL EXAMINATION AT A HEALTH CARE FACILITY: ICD-10-CM

## 2023-12-27 DIAGNOSIS — E11.69 DM TYPE 2 WITH DIABETIC DYSLIPIDEMIA (HCC): ICD-10-CM

## 2023-12-27 DIAGNOSIS — E78.5 DM TYPE 2 WITH DIABETIC DYSLIPIDEMIA (HCC): ICD-10-CM

## 2023-12-28 ENCOUNTER — OFFICE VISIT (OUTPATIENT)
Dept: FAMILY MEDICINE CLINIC | Age: 48
End: 2023-12-28
Payer: COMMERCIAL

## 2023-12-28 VITALS
DIASTOLIC BLOOD PRESSURE: 70 MMHG | WEIGHT: 132 LBS | SYSTOLIC BLOOD PRESSURE: 114 MMHG | HEART RATE: 99 BPM | BODY MASS INDEX: 24.14 KG/M2 | OXYGEN SATURATION: 98 %

## 2023-12-28 DIAGNOSIS — E78.5 DM TYPE 2 WITH DIABETIC DYSLIPIDEMIA (HCC): Primary | ICD-10-CM

## 2023-12-28 DIAGNOSIS — R55 VASOVAGAL SYNCOPE: ICD-10-CM

## 2023-12-28 DIAGNOSIS — E11.69 DM TYPE 2 WITH DIABETIC DYSLIPIDEMIA (HCC): Primary | ICD-10-CM

## 2023-12-28 DIAGNOSIS — R59.0 OCCIPITAL LYMPHADENOPATHY: ICD-10-CM

## 2023-12-28 LAB
ALBUMIN SERPL-MCNC: 4.4 G/DL (ref 3.4–5)
ALBUMIN/GLOB SERPL: 1.8 {RATIO} (ref 1.1–2.2)
ALP SERPL-CCNC: 54 U/L (ref 40–129)
ALT SERPL-CCNC: 17 U/L (ref 10–40)
ANION GAP SERPL CALCULATED.3IONS-SCNC: 7 MMOL/L (ref 3–16)
AST SERPL-CCNC: 18 U/L (ref 15–37)
BASOPHILS # BLD: 0 K/UL (ref 0–0.2)
BASOPHILS NFR BLD: 0.5 %
BILIRUB SERPL-MCNC: 0.5 MG/DL (ref 0–1)
BUN SERPL-MCNC: 15 MG/DL (ref 7–20)
CALCIUM SERPL-MCNC: 9.9 MG/DL (ref 8.3–10.6)
CHLORIDE SERPL-SCNC: 106 MMOL/L (ref 99–110)
CHOLEST SERPL-MCNC: 163 MG/DL (ref 0–199)
CO2 SERPL-SCNC: 26 MMOL/L (ref 21–32)
CREAT SERPL-MCNC: 0.7 MG/DL (ref 0.6–1.1)
DEPRECATED RDW RBC AUTO: 13.1 % (ref 12.4–15.4)
EOSINOPHIL # BLD: 0.4 K/UL (ref 0–0.6)
EOSINOPHIL NFR BLD: 5.1 %
GFR SERPLBLD CREATININE-BSD FMLA CKD-EPI: >60 ML/MIN/{1.73_M2}
GLUCOSE SERPL-MCNC: 109 MG/DL (ref 70–99)
HBA1C MFR BLD: 5.4 %
HCT VFR BLD AUTO: 43.6 % (ref 36–48)
HDLC SERPL-MCNC: 55 MG/DL (ref 40–60)
HGB BLD-MCNC: 14.8 G/DL (ref 12–16)
LDLC SERPL CALC-MCNC: 89 MG/DL
LYMPHOCYTES # BLD: 1.8 K/UL (ref 1–5.1)
LYMPHOCYTES NFR BLD: 23.6 %
MCH RBC QN AUTO: 30.5 PG (ref 26–34)
MCHC RBC AUTO-ENTMCNC: 34 G/DL (ref 31–36)
MCV RBC AUTO: 89.5 FL (ref 80–100)
MONOCYTES # BLD: 0.5 K/UL (ref 0–1.3)
MONOCYTES NFR BLD: 6.5 %
NEUTROPHILS # BLD: 4.8 K/UL (ref 1.7–7.7)
NEUTROPHILS NFR BLD: 64.3 %
PLATELET # BLD AUTO: 339 K/UL (ref 135–450)
PMV BLD AUTO: 6.9 FL (ref 5–10.5)
POTASSIUM SERPL-SCNC: 4.2 MMOL/L (ref 3.5–5.1)
PROT SERPL-MCNC: 6.9 G/DL (ref 6.4–8.2)
RBC # BLD AUTO: 4.87 M/UL (ref 4–5.2)
SODIUM SERPL-SCNC: 139 MMOL/L (ref 136–145)
TRIGL SERPL-MCNC: 94 MG/DL (ref 0–150)
TSH SERPL DL<=0.005 MIU/L-ACNC: 1.03 UIU/ML (ref 0.27–4.2)
VLDLC SERPL CALC-MCNC: 19 MG/DL
WBC # BLD AUTO: 7.4 K/UL (ref 4–11)

## 2023-12-28 PROCEDURE — 3044F HG A1C LEVEL LT 7.0%: CPT | Performed by: FAMILY MEDICINE

## 2023-12-28 PROCEDURE — 99214 OFFICE O/P EST MOD 30 MIN: CPT | Performed by: FAMILY MEDICINE

## 2023-12-28 PROCEDURE — 83036 HEMOGLOBIN GLYCOSYLATED A1C: CPT | Performed by: FAMILY MEDICINE

## 2023-12-28 RX ORDER — PANTOPRAZOLE SODIUM 20 MG/1
TABLET, DELAYED RELEASE ORAL
Qty: 90 TABLET | Refills: 1 | Status: SHIPPED | OUTPATIENT
Start: 2023-12-28

## 2023-12-28 RX ORDER — BUSPIRONE HYDROCHLORIDE 15 MG/1
TABLET ORAL
Qty: 180 TABLET | Refills: 1 | Status: SHIPPED | OUTPATIENT
Start: 2023-12-28

## 2023-12-28 RX ORDER — PRAVASTATIN SODIUM 40 MG
TABLET ORAL
Qty: 90 TABLET | Refills: 1 | Status: SHIPPED | OUTPATIENT
Start: 2023-12-28

## 2023-12-28 RX ORDER — TIRZEPATIDE 10 MG/.5ML
10 INJECTION, SOLUTION SUBCUTANEOUS WEEKLY
Qty: 4 ADJUSTABLE DOSE PRE-FILLED PEN SYRINGE | Refills: 0 | Status: SHIPPED | OUTPATIENT
Start: 2023-12-28

## 2023-12-28 RX ORDER — SERTRALINE HYDROCHLORIDE 100 MG/1
TABLET, FILM COATED ORAL
Qty: 180 TABLET | Refills: 1 | Status: SHIPPED | OUTPATIENT
Start: 2023-12-28

## 2023-12-28 RX ORDER — METFORMIN HYDROCHLORIDE 500 MG/1
500 TABLET, EXTENDED RELEASE ORAL
Qty: 90 TABLET | Refills: 1 | Status: SHIPPED | OUTPATIENT
Start: 2023-12-28

## 2023-12-28 NOTE — TELEPHONE ENCOUNTER
Received phone call from Nigel Mcgovern with [facility] in regards to pts Tirzepatide [facility]) 12.5 MG/0.5ML SOPN SC injection [[id number]]    This is on backorder, they are requesting a 10 MG script be sent this is available and pt has missed a dose please advise    Pended below with correct pharmacy

## 2024-02-02 DIAGNOSIS — F39 MOOD INSOMNIA (HCC): ICD-10-CM

## 2024-02-02 DIAGNOSIS — F51.05 MOOD INSOMNIA (HCC): ICD-10-CM

## 2024-02-02 RX ORDER — HYDROXYZINE HYDROCHLORIDE 25 MG/1
TABLET, FILM COATED ORAL
Qty: 90 TABLET | Refills: 3 | Status: SHIPPED | OUTPATIENT
Start: 2024-02-02

## 2024-02-02 NOTE — TELEPHONE ENCOUNTER
Refill Request     CONFIRM preferred pharmacy with the patient.    If Mail Order Rx - Pend for 90 day refill.      Last Seen: Last Seen Department: 12/28/2023  Last Seen by PCP: 12/28/2023    Last Written: 12/23/22 #90, 3 refills    If no future appointment scheduled:  Review the last OV with PCP and review information for follow-up visit,  Route STAFF MESSAGE with patient name to the  Pool for scheduling with the following information:            -  Timing of next visit           -  Visit type ie Physical, OV, etc           -  Diagnoses/Reason ie. COPD, HTN - Do not use MEDICATION, Follow-up or CHECK UP - Give reason for visit      Next Appointment:   Future Appointments   Date Time Provider Department Center   7/2/2024 10:00 AM Hanna William MD [facility]       Message sent to  to schedule appt with patient?  NO      Requested Prescriptions     Pending Prescriptions Disp Refills    hydrOXYzine HCl (ATARAX) 25 MG tablet [Pharmacy Med Name: hydrOXYzine HCL 25 mg tablet (ATARAX)] 90 tablet 3     Sig: Take 1 tablet (25 mg total) by mouth at bedtime.

## 2024-02-06 ENCOUNTER — E-VISIT (OUTPATIENT)
Dept: FAMILY MEDICINE CLINIC | Age: 49
End: 2024-02-06

## 2024-02-06 ENCOUNTER — TELEPHONE (OUTPATIENT)
Dept: FAMILY MEDICINE CLINIC | Age: 49
End: 2024-02-06

## 2024-02-06 DIAGNOSIS — R59.0 OCCIPITAL LYMPHADENOPATHY: Primary | ICD-10-CM

## 2024-02-06 DIAGNOSIS — N76.0 ACUTE VAGINITIS: Primary | ICD-10-CM

## 2024-02-06 DIAGNOSIS — B37.9 YEAST INFECTION: ICD-10-CM

## 2024-02-06 PROCEDURE — 99999 PR OFFICE/OUTPT VISIT,PROCEDURE ONLY: CPT | Performed by: FAMILY MEDICINE

## 2024-02-06 NOTE — TELEPHONE ENCOUNTER
1) Received phone call from the pt in regards to she has a yeast infection going on and does not follow up with her new gynocologist for 2 more weeks, I advised pt to complete an E-Visit for this matter pt agrreable.    2) Pt is asking for an US of the lymph nodes to be placed upon discussion from her visit on 12/28/24 and to be placed for [facility]. Pt is going to get fax number and provide us with that information to fax order once placed. US pended below

## 2024-02-06 NOTE — TELEPHONE ENCOUNTER
Isabellit has not been done yet - I sent her one, so she should get a link to it.     I have signed the order for ultrasound.

## 2024-02-06 NOTE — TELEPHONE ENCOUNTER
Patient returned call to the office is submitting the evisit questionnaire and will call back again with the fax number for the US

## 2024-02-16 RX ORDER — FLUCONAZOLE 150 MG/1
150 TABLET ORAL ONCE
Qty: 1 TABLET | Refills: 0 | Status: SHIPPED | OUTPATIENT
Start: 2024-02-16 | End: 2024-02-16

## 2024-02-16 NOTE — PROGRESS NOTES
HPI: as per patient provided history  Exam: N/A (electronic visit)  ASSESSMENT/PLAN:  1. Acute vaginitis  - fluconazole (DIFLUCAN) 150 MG tablet; Take 1 tablet by mouth once for 1 dose  Dispense: 1 tablet; Refill: 0       Patient instructed to call the office if worsens, or fails to improve as anticipated.    5-10 minutes were spent on the digital evaluation and management of this patient.

## 2024-05-02 ENCOUNTER — PATIENT MESSAGE (OUTPATIENT)
Dept: FAMILY MEDICINE CLINIC | Age: 49
End: 2024-05-02

## 2024-05-02 ENCOUNTER — TELEMEDICINE (OUTPATIENT)
Age: 49
End: 2024-05-02
Payer: COMMERCIAL

## 2024-05-02 DIAGNOSIS — B37.0 ORAL CANDIDIASIS: Primary | ICD-10-CM

## 2024-05-02 PROCEDURE — 99213 OFFICE O/P EST LOW 20 MIN: CPT | Performed by: NURSE PRACTITIONER

## 2024-05-02 RX ORDER — FLUCONAZOLE 150 MG/1
150 TABLET ORAL
Qty: 2 TABLET | Refills: 0 | Status: SHIPPED | OUTPATIENT
Start: 2024-05-02 | End: 2024-05-02 | Stop reason: ALTCHOICE

## 2024-05-02 RX ORDER — FLUCONAZOLE 150 MG/1
150 TABLET ORAL DAILY
Qty: 2 TABLET | Refills: 0 | Status: SHIPPED | OUTPATIENT
Start: 2024-05-02

## 2024-05-02 NOTE — TELEPHONE ENCOUNTER
From: Cristel Vigil  To: Dr. Hanna William  Sent: 5/2/2024 8:56 AM EDT  Subject: Thrush    Dr. William, I have a case of thrush. I imagine it is from the Symbicort. I have tried using salt water but it is not clearing up on its own. Could I get medication to help? If so, please send to Jama at [address].     Thank you!

## 2024-05-02 NOTE — PROGRESS NOTES
patient would benefit from future follow up with their usual PCP. As of the end of their Virtualist Visit today, follow up visit status is as follows: No PCP availability           Subjective   This is a 48-year-old female patient of Dr. William consenting to a virtual visit  Patient complains of white spots in the oral area especially on the tongue area due to her history of asthma and her inhalers.  She does have a history of thrush she states in the past and has been treated.  She prefers not the oral suspension antifungal she prefers the pill she states.  She has had her symptoms 2 to 3 weeks now and has used salt water gargles and peroxide gargles with no relief.      Review of Systems   HENT:          White spots on tongue   All other systems reviewed and are negative.        Objective   Patient-Reported Vitals  Patient-Reported Weight: 132 lbs  Patient-Reported Height: 5’2”       Physical Exam  [INSTRUCTIONS:  \"[x]\" Indicates a positive item  \"[]\" Indicates a negative item  -- DELETE ALL ITEMS NOT EXAMINED]    Constitutional: [x] Appears well-developed and well-nourished [x] No apparent distress      [] Abnormal -     Mental status: [x] Alert and awake  [x] Oriented to person/place/time [x] Able to follow commands    [] Abnormal -     Eyes:   EOM    [x]  Normal    [] Abnormal -   Sclera  [x]  Normal    [] Abnormal -          Discharge [x]  None visible   [] Abnormal -     HENT: [x] Normocephalic, atraumatic  [] Abnormal -   [] Mouth/Throat: Mucous membranes are moist    External Ears [] Normal  [] Abnormal -    Neck: [x] No visualized mass [] Abnormal -     Pulmonary/Chest: [x] Respiratory effort normal   [x] No visualized signs of difficulty breathing or respiratory distress        [] Abnormal -      Musculoskeletal:   [] Normal gait with no signs of ataxia         [x] Normal range of motion of neck        [] Abnormal -     Neurological:        [x] No Facial Asymmetry (Cranial nerve 7 motor function) (limited

## 2024-05-10 DIAGNOSIS — B37.0 THRUSH: Primary | ICD-10-CM

## 2024-05-10 NOTE — PROGRESS NOTES
Nystatin oral solution sent to [facility] per patient request due to inadequate response from diflucan.

## 2024-05-12 NOTE — TELEPHONE ENCOUNTER
Refill Request     CONFIRM preferred pharmacy with the patient.    If Mail Order Rx - Pend for 90 day refill.      Last Seen: Last Seen Department: 2/6/2024  Last Seen by PCP: 2/6/2024    Last Written: Mounjaro 12/22/23 2ml 2 refills     If no future appointment scheduled:  Review the last OV with PCP and review information for follow-up visit,  Route STAFF MESSAGE with patient name to the  Pool for scheduling with the following information:            -  Timing of next visit           -  Visit type ie Physical, OV, etc           -  Diagnoses/Reason ie. COPD, HTN - Do not use MEDICATION, Follow-up or CHECK UP - Give reason for visit      Next Appointment:   Future Appointments   Date Time Provider Department Center   7/2/2024 10:00 AM Hanna William MD [facility]       Message sent to  to schedule appt with patient?  NO      Requested Prescriptions     Pending Prescriptions Disp Refills    MOUNJARO 12.5 MG/0.5ML SOPN SC injection [Pharmacy Med Name: Mounjaro 12.5 mg/0.5 mL subcutaneous pen injector (tirzepatide)] 2 mL 2     Sig: Inject 0.5 mLs into the skin once a week

## 2024-05-13 RX ORDER — TIRZEPATIDE 12.5 MG/.5ML
12.5 INJECTION, SOLUTION SUBCUTANEOUS WEEKLY
Qty: 2 ML | Refills: 2 | Status: SHIPPED | OUTPATIENT
Start: 2024-05-13

## 2024-06-29 ASSESSMENT — PATIENT HEALTH QUESTIONNAIRE - PHQ9
SUM OF ALL RESPONSES TO PHQ QUESTIONS 1-9: 2
8. MOVING OR SPEAKING SO SLOWLY THAT OTHER PEOPLE COULD HAVE NOTICED. OR THE OPPOSITE, BEING SO FIGETY OR RESTLESS THAT YOU HAVE BEEN MOVING AROUND A LOT MORE THAN USUAL: SEVERAL DAYS
SUM OF ALL RESPONSES TO PHQ9 QUESTIONS 1 & 2: 0
1. LITTLE INTEREST OR PLEASURE IN DOING THINGS: NOT AT ALL
2. FEELING DOWN, DEPRESSED OR HOPELESS: NOT AT ALL
SUM OF ALL RESPONSES TO PHQ9 QUESTIONS 1 & 2: 0
4. FEELING TIRED OR HAVING LITTLE ENERGY: NOT AT ALL
6. FEELING BAD ABOUT YOURSELF - OR THAT YOU ARE A FAILURE OR HAVE LET YOURSELF OR YOUR FAMILY DOWN: NOT AT ALL
10. IF YOU CHECKED OFF ANY PROBLEMS, HOW DIFFICULT HAVE THESE PROBLEMS MADE IT FOR YOU TO DO YOUR WORK, TAKE CARE OF THINGS AT HOME, OR GET ALONG WITH OTHER PEOPLE: NOT DIFFICULT AT ALL
7. TROUBLE CONCENTRATING ON THINGS, SUCH AS READING THE NEWSPAPER OR WATCHING TELEVISION: NOT AT ALL
1. LITTLE INTEREST OR PLEASURE IN DOING THINGS: NOT AT ALL
5. POOR APPETITE OR OVEREATING: NOT AT ALL
9. THOUGHTS THAT YOU WOULD BE BETTER OFF DEAD, OR OF HURTING YOURSELF: NOT AT ALL
SUM OF ALL RESPONSES TO PHQ QUESTIONS 1-9: 2
3. TROUBLE FALLING OR STAYING ASLEEP: SEVERAL DAYS
2. FEELING DOWN, DEPRESSED OR HOPELESS: NOT AT ALL
SUM OF ALL RESPONSES TO PHQ QUESTIONS 1-9: 2
SUM OF ALL RESPONSES TO PHQ QUESTIONS 1-9: 2

## 2024-07-02 ENCOUNTER — OFFICE VISIT (OUTPATIENT)
Dept: FAMILY MEDICINE CLINIC | Age: 49
End: 2024-07-02
Payer: COMMERCIAL

## 2024-07-02 VITALS
HEIGHT: 62 IN | DIASTOLIC BLOOD PRESSURE: 70 MMHG | BODY MASS INDEX: 23.55 KG/M2 | SYSTOLIC BLOOD PRESSURE: 110 MMHG | HEART RATE: 80 BPM | TEMPERATURE: 98 F | WEIGHT: 128 LBS | OXYGEN SATURATION: 98 %

## 2024-07-02 DIAGNOSIS — E11.69 DM TYPE 2 WITH DIABETIC DYSLIPIDEMIA (HCC): ICD-10-CM

## 2024-07-02 DIAGNOSIS — E78.5 DM TYPE 2 WITH DIABETIC DYSLIPIDEMIA (HCC): ICD-10-CM

## 2024-07-02 DIAGNOSIS — Z00.00 ROUTINE GENERAL MEDICAL EXAMINATION AT A HEALTH CARE FACILITY: Primary | ICD-10-CM

## 2024-07-02 LAB
CREATININE URINE POCT: 200
HBA1C MFR BLD: 5.3 %
MICROALBUMIN/CREAT 24H UR: 30 MG/DL
MICROALBUMIN/CREAT UR-RTO: <30 MG/G

## 2024-07-02 PROCEDURE — 99396 PREV VISIT EST AGE 40-64: CPT | Performed by: FAMILY MEDICINE

## 2024-07-02 PROCEDURE — 82044 UR ALBUMIN SEMIQUANTITATIVE: CPT | Performed by: FAMILY MEDICINE

## 2024-07-02 PROCEDURE — 83036 HEMOGLOBIN GLYCOSYLATED A1C: CPT | Performed by: FAMILY MEDICINE

## 2024-07-02 RX ORDER — BLOOD SUGAR DIAGNOSTIC
1 STRIP MISCELLANEOUS 3 TIMES DAILY
Qty: 300 EACH | Refills: 3 | Status: SHIPPED | OUTPATIENT
Start: 2024-07-02

## 2024-07-02 RX ORDER — LANCETS 33 GAUGE
1 EACH MISCELLANEOUS
Qty: 300 EACH | Refills: 3 | Status: SHIPPED | OUTPATIENT
Start: 2024-07-02

## 2024-07-02 RX ORDER — BLOOD-GLUCOSE METER
1 EACH MISCELLANEOUS
Qty: 1 KIT | Refills: 0 | Status: SHIPPED | OUTPATIENT
Start: 2024-07-02

## 2024-07-02 SDOH — ECONOMIC STABILITY: FOOD INSECURITY: WITHIN THE PAST 12 MONTHS, THE FOOD YOU BOUGHT JUST DIDN'T LAST AND YOU DIDN'T HAVE MONEY TO GET MORE.: NEVER TRUE

## 2024-07-02 SDOH — ECONOMIC STABILITY: INCOME INSECURITY: HOW HARD IS IT FOR YOU TO PAY FOR THE VERY BASICS LIKE FOOD, HOUSING, MEDICAL CARE, AND HEATING?: NOT HARD AT ALL

## 2024-07-02 SDOH — ECONOMIC STABILITY: FOOD INSECURITY: WITHIN THE PAST 12 MONTHS, YOU WORRIED THAT YOUR FOOD WOULD RUN OUT BEFORE YOU GOT MONEY TO BUY MORE.: NEVER TRUE

## 2024-07-02 ASSESSMENT — ANXIETY QUESTIONNAIRES
4. TROUBLE RELAXING: NOT AT ALL
7. FEELING AFRAID AS IF SOMETHING AWFUL MIGHT HAPPEN: NOT AT ALL
3. WORRYING TOO MUCH ABOUT DIFFERENT THINGS: SEVERAL DAYS
6. BECOMING EASILY ANNOYED OR IRRITABLE: NOT AT ALL
GAD7 TOTAL SCORE: 4
5. BEING SO RESTLESS THAT IT IS HARD TO SIT STILL: SEVERAL DAYS
2. NOT BEING ABLE TO STOP OR CONTROL WORRYING: SEVERAL DAYS
IF YOU CHECKED OFF ANY PROBLEMS ON THIS QUESTIONNAIRE, HOW DIFFICULT HAVE THESE PROBLEMS MADE IT FOR YOU TO DO YOUR WORK, TAKE CARE OF THINGS AT HOME, OR GET ALONG WITH OTHER PEOPLE: NOT DIFFICULT AT ALL
1. FEELING NERVOUS, ANXIOUS, OR ON EDGE: SEVERAL DAYS

## 2024-07-02 NOTE — PROGRESS NOTES
Well Adult Note  Name: Cristel Vigil Today’s Date: 2024   MRN: [id number] Sex: Female   Age: 48 y.o. Ethnicity: Non- / Non    : 1975 Race: White (non-)      Cristel Vigil is here for well adult exam and follow up on type 2 diabetes.   History:  She continues to do well on Mounjaro 12.5 mg weekly and metformin 500 mg XR once daily.  Her weight has continued to trend downward, and she is 4 lbs down from the last visit. From her highest weight, she is down almost 100 lbs.     Review of Systems   Constitutional: Negative.    HENT: Negative.     Eyes: Negative.    Respiratory: Negative.     Cardiovascular: Negative.    Gastrointestinal: Negative.    Genitourinary: Negative.    Musculoskeletal: Negative.    Skin: Negative.    Neurological: Negative.    Psychiatric/Behavioral: Negative.         Allergies   Allergen Reactions    Adhesive Tape      Other reaction(s): rash/blistering  2020 -  2019 -  2019 -      Amoxicillin Hives    Pcn [Penicillins] Hives         Prior to Visit Medications    Medication Sig Taking? Authorizing Provider   MOUNJARO 12.5 MG/0.5ML SOPN SC injection Inject 0.5 mLs into the skin once a week  Patient taking differently: Inject 0.5 mLs into the skin once a week Takes on Wednesday Yes Hanna William MD   hydrOXYzine HCl (ATARAX) 25 MG tablet Take 1 tablet (25 mg total) by mouth at bedtime. Yes Hanna William MD   busPIRone (BUSPAR) 15 MG tablet Take 1 tablet (15 mg total) by mouth 2 times a day. Yes Hanna William MD   metFORMIN (GLUCOPHAGE-XR) 500 MG extended release tablet Take 1 tablet by mouth daily (with breakfast) Yes Hanna William MD   pantoprazole (PROTONIX) 20 MG tablet TAKE ONE TAB BY MOUTH DAILY Yes Hanna William MD   pravastatin (PRAVACHOL) 40 MG tablet Take 1 tablet (40 mg total) by mouth daily. Yes Hanna William MD   sertraline (ZOLOFT) 100 MG tablet Take 2 tablets (200 mg total) by mouth daily. Strength: 100 mg Yes Hanna William

## 2024-07-05 ASSESSMENT — ENCOUNTER SYMPTOMS
EYES NEGATIVE: 1
GASTROINTESTINAL NEGATIVE: 1
RESPIRATORY NEGATIVE: 1

## 2024-08-07 DIAGNOSIS — F39 MOOD INSOMNIA (HCC): ICD-10-CM

## 2024-08-07 DIAGNOSIS — F51.05 MOOD INSOMNIA (HCC): ICD-10-CM

## 2024-08-07 RX ORDER — TIRZEPATIDE 12.5 MG/.5ML
12.5 INJECTION, SOLUTION SUBCUTANEOUS WEEKLY
Qty: 2 ML | Refills: 1 | Status: SHIPPED | OUTPATIENT
Start: 2024-08-07

## 2024-08-07 RX ORDER — PRAVASTATIN SODIUM 40 MG
TABLET ORAL
Qty: 90 TABLET | Refills: 1 | Status: SHIPPED | OUTPATIENT
Start: 2024-08-07

## 2024-08-07 RX ORDER — HYDROXYZINE HYDROCHLORIDE 25 MG/1
TABLET, FILM COATED ORAL
Qty: 90 TABLET | Refills: 1 | Status: SHIPPED | OUTPATIENT
Start: 2024-08-07

## 2024-08-07 NOTE — TELEPHONE ENCOUNTER
.Refill Request     CONFIRM preferred pharmacy with the patient.    If Mail Order Rx - Pend for 90 day refill.      Last Seen: Last Seen Department: 7/2/2024  Last Seen by PCP: 7/2/2024    Last Written: Hysdroxyzine 2-2-24 90 with 3   Mounjaro 5-13-24 2 ml with 2     If no future appointment scheduled:  Review the last OV with PCP and review information for follow-up visit,  Route STAFF MESSAGE with patient name to the  Pool for scheduling with the following information:            -  Timing of next visit           -  Visit type ie Physical, OV, etc           -  Diagnoses/Reason ie. COPD, HTN - Do not use MEDICATION, Follow-up or CHECK UP - Give reason for visit      Next Appointment:   Future Appointments   Date Time Provider Department Center   1/7/2025 10:00 AM [facility]       Message sent to  to schedule appt with patient?  NO      Requested Prescriptions     Pending Prescriptions Disp Refills    pravastatin (PRAVACHOL) 40 MG tablet 90 tablet 1     Sig: Take 1 tablet (40 mg total) by mouth daily.    hydrOXYzine HCl (ATARAX) 25 MG tablet 90 tablet 3    Tirzepatide (MOUNJARO) 12.5 MG/0.5ML SOPN SC injection 2 mL 2     Sig: Inject 0.5 mLs into the skin once a week

## 2024-09-02 DIAGNOSIS — E78.5 DM TYPE 2 WITH DIABETIC DYSLIPIDEMIA (HCC): ICD-10-CM

## 2024-09-02 DIAGNOSIS — E11.69 DM TYPE 2 WITH DIABETIC DYSLIPIDEMIA (HCC): ICD-10-CM

## 2024-09-03 NOTE — TELEPHONE ENCOUNTER
Refill Request     CONFIRM preferred pharmacy with the patient.    If Mail Order Rx - Pend for 90 day refill.      Last Seen: Last Seen Department: 7/2/2024  Last Seen by PCP: 7/2/2024    Last Written: 12/28/2023    If no future appointment scheduled:  Review the last OV with PCP and review information for follow-up visit,  Route STAFF MESSAGE with patient name to the  Pool for scheduling with the following information:            -  Timing of next visit           -  Visit type ie Physical, OV, etc           -  Diagnoses/Reason ie. COPD, HTN - Do not use MEDICATION, Follow-up or CHECK UP - Give reason for visit      Next Appointment:   Future Appointments   Date Time Provider Department Center   1/7/2025 10:00 AM Hanna William MD [facility] DEP       Message sent to  to schedule appt with patient?  NO      Requested Prescriptions     Pending Prescriptions Disp Refills    metFORMIN (GLUCOPHAGE-XR) 500 MG extended release tablet 90 tablet 1     Sig: Take 1 tablet by mouth daily (with breakfast)    pantoprazole (PROTONIX) 20 MG tablet 90 tablet 1     Sig: TAKE ONE TAB BY MOUTH DAILY

## 2024-09-04 RX ORDER — PANTOPRAZOLE SODIUM 20 MG/1
TABLET, DELAYED RELEASE ORAL
Qty: 90 TABLET | Refills: 1 | Status: SHIPPED | OUTPATIENT
Start: 2024-09-04

## 2024-09-04 RX ORDER — METFORMIN HCL 500 MG
500 TABLET, EXTENDED RELEASE 24 HR ORAL
Qty: 90 TABLET | Refills: 1 | Status: SHIPPED | OUTPATIENT
Start: 2024-09-04

## 2024-09-26 RX ORDER — BUSPIRONE HYDROCHLORIDE 15 MG/1
TABLET ORAL
Qty: 180 TABLET | Refills: 1 | Status: SHIPPED | OUTPATIENT
Start: 2024-09-26

## 2024-09-26 RX ORDER — TIRZEPATIDE 12.5 MG/.5ML
12.5 INJECTION, SOLUTION SUBCUTANEOUS WEEKLY
Qty: 2 ML | Refills: 3 | Status: SHIPPED | OUTPATIENT
Start: 2024-09-26

## 2024-09-26 RX ORDER — SERTRALINE HYDROCHLORIDE 100 MG/1
TABLET, FILM COATED ORAL
Qty: 180 TABLET | Refills: 1 | Status: SHIPPED | OUTPATIENT
Start: 2024-09-26

## 2024-11-09 DIAGNOSIS — E78.5 DM TYPE 2 WITH DIABETIC DYSLIPIDEMIA (HCC): ICD-10-CM

## 2024-11-09 DIAGNOSIS — E11.69 DM TYPE 2 WITH DIABETIC DYSLIPIDEMIA (HCC): ICD-10-CM

## 2024-11-10 NOTE — TELEPHONE ENCOUNTER
Refill Request     CONFIRM preferred pharmacy with the patient.    If Mail Order Rx - Pend for 90 day refill.      Last Seen: Last Seen Department: 7/2/2024  Last Seen by PCP: 7/2/2024    Last Written: 9/4/24 90 tab 1 refills    If no future appointment scheduled:  Review the last OV with PCP and review information for follow-up visit,  Route STAFF MESSAGE with patient name to the  Pool for scheduling with the following information:            -  Timing of next visit           -  Visit type ie Physical, OV, etc           -  Diagnoses/Reason ie. COPD, HTN - Do not use MEDICATION, Follow-up or CHECK UP - Give reason for visit      Next Appointment:   Future Appointments   Date Time Provider Department Center   1/7/2025 10:00 AM Hanna William MD [facility] DEP       Message sent to  to schedule appt with patient?  NO      Requested Prescriptions     Pending Prescriptions Disp Refills    metFORMIN (GLUCOPHAGE-XR) 500 MG extended release tablet [Pharmacy Med Name: metFORMIN  mg tablet,extended release 24 hr (GLUCOPHAGE-XR)] 90 tablet 1     Sig: Take 1 tablet by mouth daily (with breakfast)

## 2024-11-11 RX ORDER — METFORMIN HYDROCHLORIDE 500 MG/1
500 TABLET, EXTENDED RELEASE ORAL
Qty: 90 TABLET | Refills: 1 | OUTPATIENT
Start: 2024-11-11

## 2024-12-14 NOTE — TELEPHONE ENCOUNTER
Medication was sent in on 9/26/2024 #180 with 1 refill. Will need to call pharmacy Monday to see if there is still a refill left.

## 2024-12-16 RX ORDER — BUSPIRONE HYDROCHLORIDE 15 MG/1
TABLET ORAL
Qty: 180 TABLET | Refills: 1 | Status: SHIPPED | OUTPATIENT
Start: 2024-12-16

## 2024-12-16 NOTE — TELEPHONE ENCOUNTER
Spoke with earnest at the pharmacy, she stated that they were sending a refill request for future refills in march

## 2024-12-23 ENCOUNTER — TELEPHONE (OUTPATIENT)
Dept: FAMILY MEDICINE CLINIC | Age: 49
End: 2024-12-23

## 2024-12-23 NOTE — TELEPHONE ENCOUNTER
Submitted PA for Mounjaro 12.5MG/0.5ML auto-injectors   Via CMM Key: [id number] STATUS: PENDING.    Follow up done daily; if no decision with in three days we will refax.  If another three days goes by with no decision will call the insurance for status.

## 2024-12-24 NOTE — TELEPHONE ENCOUNTER
Please appeal:    Patient has type 2 diabetes. Baseline hemoglobin A1C prior to Mounjaro was 7.1%, which was while she was taking metformin and Jardiance. She has been on Victoza in the past which did not control her diabetes previously, which is why she was changed to Mounjaro.

## 2024-12-24 NOTE — TELEPHONE ENCOUNTER
An Appeal has been faxed in for Mounjaro 12.5MG/0.5ML auto-injectors .  Appeals normally take 30 days to come back with a decision, but follow up will be done every 10 days.    If no response by the 30th day, then the insurance will be called to check status.    If this requires a response please respond to the pool ( P MHCX PSC MEDICATION PRE-AUTH).      Thank you please advise patient.

## 2024-12-31 NOTE — TELEPHONE ENCOUNTER
The medication is APPEAL APPROVED THROUGH 12/24/2025.    If this requires a response please respond to the pool ( P MHCX PSC MEDICATION PRE-AUTH).      Thank you please advise patient.

## 2025-01-13 RX ORDER — ALBUTEROL SULFATE 90 UG/1
INHALANT RESPIRATORY (INHALATION)
Qty: 1 EACH | Refills: 5 | Status: SHIPPED | OUTPATIENT
Start: 2025-01-13

## 2025-01-13 NOTE — TELEPHONE ENCOUNTER
Refill Request     CONFIRM preferred pharmacy with the patient.    If Mail Order Rx - Pend for 90 day refill.      Last Seen: Last Seen Department: 7/2/2024  Last Seen by PCP: Visit date not found    Last Written: 8/10/22 1 with 5 refills     If no future appointment scheduled:  Review the last OV with PCP and review information for follow-up visit,  Route STAFF MESSAGE with patient name to the  Pool for scheduling with the following information:            -  Timing of next visit           -  Visit type ie Physical, OV, etc           -  Diagnoses/Reason ie. COPD, HTN - Do not use MEDICATION, Follow-up or CHECK UP - Give reason for visit      Next Appointment:   Future Appointments   Date Time Provider Department Center   2/18/2025  1:00 PM Hanna William MD [facility] DEP       Message sent to  to schedule appt with patient?  NO      Requested Prescriptions     Pending Prescriptions Disp Refills    albuterol sulfate HFA (VENTOLIN HFA) 108 (90 Base) MCG/ACT inhaler 1 each 5     Sig: Inhale 2 puffs into the lungs every 6 hours as needed for wheezing.

## 2025-01-22 ENCOUNTER — OFFICE VISIT (OUTPATIENT)
Age: 50
End: 2025-01-22

## 2025-01-22 VITALS
TEMPERATURE: 98.3 F | HEART RATE: 84 BPM | DIASTOLIC BLOOD PRESSURE: 70 MMHG | SYSTOLIC BLOOD PRESSURE: 110 MMHG | OXYGEN SATURATION: 98 %

## 2025-01-22 DIAGNOSIS — J01.90 ACUTE SINUSITIS, RECURRENCE NOT SPECIFIED, UNSPECIFIED LOCATION: Primary | ICD-10-CM

## 2025-01-22 RX ORDER — AZITHROMYCIN 250 MG/1
TABLET, FILM COATED ORAL
Qty: 6 TABLET | Refills: 0 | Status: SHIPPED | OUTPATIENT
Start: 2025-01-22

## 2025-01-22 RX ORDER — PREDNISONE 20 MG/1
40 TABLET ORAL DAILY
Qty: 10 TABLET | Refills: 0 | Status: SHIPPED | OUTPATIENT
Start: 2025-01-22 | End: 2025-01-27

## 2025-01-22 RX ORDER — FLUCONAZOLE 150 MG/1
150 TABLET ORAL ONCE
Qty: 1 TABLET | Refills: 0 | Status: SHIPPED | OUTPATIENT
Start: 2025-01-22 | End: 2025-01-22

## 2025-01-22 NOTE — PROGRESS NOTES
Cristel Vigil (:  1975) is a 49 y.o. female,  here for evaluation of the following chief complaint(s): Sinusitis (X3-4 weeks , sinus pain in to teeth )    Cristel Vigil is a: New patient.   Last Urgent Care Visit: Visit date not found  I have reviewed the patient's medications and basic medical history; see Medication Reconciliation.    ASSESSMENT/PLAN:  Diagnosis:     ICD-10-CM    1. Acute sinusitis, recurrence not specified, unspecified location  J01.90 azithromycin (ZITHROMAX) 250 MG tablet     predniSONE (DELTASONE) 20 MG tablet             Medical Decision Making:   Patient was seen at Urgent Care today for sinus/nasal congestion; sinus pain and pressure;. She has had mild sinus and nasal congestion for a few weeks but sx have significantly worsened over the last 2-3 days with increased pain and pressure.     On presentation, pt appears well. They are afrebile, not hypoxic or in any respiratory distress.   Lungs clear on auscultation.  Mild tenderness over maxillary sinuses. Exam otherwise unremarkable.      Patient will be treated for Sinusitis    Prescription(s) provided: Zpak; and Prednisone;    Patient was discharged home with follow-up and return precautions.    Patient did not have elevated blood pressure greater than 130/80. Therefore, referral to PCP for HTN is not indicated      Results:  No results found for any visits on 25.       SUBJECTIVE/OBJECTIVE:  HPI    This is a 49 y.o. female that presents today with complaint of: sinus/nasal congestion; sinus pain and pressure;     Pt reports mild congestion over the past couple weeks. This was fairly mild and manageable until this weekend when sx worsened.   Over the last 3-4 days symptoms have worsened and she has had increased sinus pressure and pain, mainly over left maxillary sinus. Her teeth also are sore.   No fever.   Minimal cough, congestion.       Vitals:    25 1124   BP: 110/70   Pulse: 84   Temp: 98.3 °F (36.8 °C)   TempSrc:

## 2025-01-28 NOTE — TELEPHONE ENCOUNTER
Refill Request     CONFIRM preferred pharmacy with the patient.    If Mail Order Rx - Pend for 90 day refill.      Last Seen: Last Seen Department: 7/2/2024  Last Seen by PCP: 7/2/2024    Last Written: 9/26/24 2 ml with 3 refills     If no future appointment scheduled:  Review the last OV with PCP and review information for follow-up visit,  Route STAFF MESSAGE with patient name to the  Pool for scheduling with the following information:            -  Timing of next visit           -  Visit type ie Physical, OV, etc           -  Diagnoses/Reason ie. COPD, HTN - Do not use MEDICATION, Follow-up or CHECK UP - Give reason for visit      Next Appointment:   Future Appointments   Date Time Provider Department Center   2/18/2025  1:00 PM Hanna William MD [facility] E[facility] DEP       Message sent to  to schedule appt with patient?  NO      Requested Prescriptions     Pending Prescriptions Disp Refills    MOUNJARO 12.5 MG/0.5ML SOAJ [Pharmacy Med Name: Mounjaro 12.5 mg/0.5 mL subcutaneous pen injector (tirzepatide)] 2 mL 2     Sig: Inject 0.5 mLs into the skin once a week

## 2025-01-29 RX ORDER — TIRZEPATIDE 12.5 MG/.5ML
0.5 INJECTION, SOLUTION SUBCUTANEOUS WEEKLY
Qty: 2 ML | Refills: 2 | Status: SHIPPED | OUTPATIENT
Start: 2025-01-29

## 2025-02-09 ENCOUNTER — PATIENT MESSAGE (OUTPATIENT)
Dept: FAMILY MEDICINE CLINIC | Age: 50
End: 2025-02-09

## 2025-02-10 NOTE — TELEPHONE ENCOUNTER
Preop scheduled.  Advised labs will be collected at the time of her appointment.  Patient stated understanding.

## 2025-02-16 DIAGNOSIS — E11.69 DM TYPE 2 WITH DIABETIC DYSLIPIDEMIA (HCC): ICD-10-CM

## 2025-02-16 DIAGNOSIS — E78.5 DM TYPE 2 WITH DIABETIC DYSLIPIDEMIA (HCC): ICD-10-CM

## 2025-02-16 SDOH — ECONOMIC STABILITY: FOOD INSECURITY: WITHIN THE PAST 12 MONTHS, YOU WORRIED THAT YOUR FOOD WOULD RUN OUT BEFORE YOU GOT MONEY TO BUY MORE.: NEVER TRUE

## 2025-02-16 SDOH — ECONOMIC STABILITY: FOOD INSECURITY: WITHIN THE PAST 12 MONTHS, THE FOOD YOU BOUGHT JUST DIDN'T LAST AND YOU DIDN'T HAVE MONEY TO GET MORE.: NEVER TRUE

## 2025-02-16 SDOH — ECONOMIC STABILITY: INCOME INSECURITY: IN THE LAST 12 MONTHS, WAS THERE A TIME WHEN YOU WERE NOT ABLE TO PAY THE MORTGAGE OR RENT ON TIME?: NO

## 2025-02-16 ASSESSMENT — PATIENT HEALTH QUESTIONNAIRE - PHQ9
2. FEELING DOWN, DEPRESSED OR HOPELESS: NOT AT ALL
8. MOVING OR SPEAKING SO SLOWLY THAT OTHER PEOPLE COULD HAVE NOTICED. OR THE OPPOSITE, BEING SO FIGETY OR RESTLESS THAT YOU HAVE BEEN MOVING AROUND A LOT MORE THAN USUAL: MORE THAN HALF THE DAYS
5. POOR APPETITE OR OVEREATING: NOT AT ALL
2. FEELING DOWN, DEPRESSED OR HOPELESS: NOT AT ALL
1. LITTLE INTEREST OR PLEASURE IN DOING THINGS: NOT AT ALL
SUM OF ALL RESPONSES TO PHQ QUESTIONS 1-9: 8
SUM OF ALL RESPONSES TO PHQ9 QUESTIONS 1 & 2: 0
6. FEELING BAD ABOUT YOURSELF - OR THAT YOU ARE A FAILURE OR HAVE LET YOURSELF OR YOUR FAMILY DOWN: NOT AT ALL
1. LITTLE INTEREST OR PLEASURE IN DOING THINGS: NOT AT ALL
SUM OF ALL RESPONSES TO PHQ QUESTIONS 1-9: 8
9. THOUGHTS THAT YOU WOULD BE BETTER OFF DEAD, OR OF HURTING YOURSELF: NOT AT ALL
7. TROUBLE CONCENTRATING ON THINGS, SUCH AS READING THE NEWSPAPER OR WATCHING TELEVISION: MORE THAN HALF THE DAYS
SUM OF ALL RESPONSES TO PHQ9 QUESTIONS 1 & 2: 0
10. IF YOU CHECKED OFF ANY PROBLEMS, HOW DIFFICULT HAVE THESE PROBLEMS MADE IT FOR YOU TO DO YOUR WORK, TAKE CARE OF THINGS AT HOME, OR GET ALONG WITH OTHER PEOPLE: NOT DIFFICULT AT ALL
3. TROUBLE FALLING OR STAYING ASLEEP: MORE THAN HALF THE DAYS
SUM OF ALL RESPONSES TO PHQ QUESTIONS 1-9: 8
SUM OF ALL RESPONSES TO PHQ QUESTIONS 1-9: 8
4. FEELING TIRED OR HAVING LITTLE ENERGY: MORE THAN HALF THE DAYS

## 2025-02-17 RX ORDER — PRAVASTATIN SODIUM 40 MG
TABLET ORAL
Qty: 90 TABLET | Refills: 0 | Status: SHIPPED | OUTPATIENT
Start: 2025-02-17

## 2025-02-17 RX ORDER — METFORMIN HYDROCHLORIDE 500 MG/1
500 TABLET, EXTENDED RELEASE ORAL
Qty: 90 TABLET | Refills: 0 | Status: SHIPPED | OUTPATIENT
Start: 2025-02-17

## 2025-02-17 NOTE — TELEPHONE ENCOUNTER
Refill Request     CONFIRM preferred pharmacy with the patient.    If Mail Order Rx - Pend for 90 day refill.      Last Seen: Last Seen Department: 7/2/2024  Last Seen by PCP: 7/2/2024    Last Written: 9/4/2024 90 tab 1 refills    If no future appointment scheduled:  Review the last OV with PCP and review information for follow-up visit,  Route STAFF MESSAGE with patient name to the  Pool for scheduling with the following information:            -  Timing of next visit           -  Visit type ie Physical, OV, etc           -  Diagnoses/Reason ie. COPD, HTN - Do not use MEDICATION, Follow-up or CHECK UP - Give reason for visit      Next Appointment:   Future Appointments   Date Time Provider Department Center   2/18/2025  9:30 AM [facility]   3/4/2025  9:00 AM [facility]       Message sent to  to schedule appt with patient?  NO      Requested Prescriptions     Pending Prescriptions Disp Refills    metFORMIN (GLUCOPHAGE-XR) 500 MG extended release tablet 90 tablet 1     Sig: Take 1 tablet by mouth daily (with breakfast)

## 2025-02-17 NOTE — TELEPHONE ENCOUNTER
Refill Request     CONFIRM preferred pharmacy with the patient.    If Mail Order Rx - Pend for 90 day refill.      Last Seen: Last Seen Department: 7/2/2024  Last Seen by PCP: Visit date not found    Last Written: 8/7/2024 90 tab 1 refills    If no future appointment scheduled:  Review the last OV with PCP and review information for follow-up visit,  Route STAFF MESSAGE with patient name to the  Pool for scheduling with the following information:            -  Timing of next visit           -  Visit type ie Physical, OV, etc           -  Diagnoses/Reason ie. COPD, HTN - Do not use MEDICATION, Follow-up or CHECK UP - Give reason for visit      Next Appointment:   Future Appointments   Date Time Provider Department Center   2/18/2025  9:30 AM [facility]   3/4/2025  9:00 AM Hanna William MD [facility] DEP       Message sent to  to schedule appt with patient?  NO      Requested Prescriptions     Pending Prescriptions Disp Refills    pravastatin (PRAVACHOL) 40 MG tablet 90 tablet 1     Sig: Take 1 tablet (40 mg total) by mouth daily.

## 2025-02-18 ENCOUNTER — OFFICE VISIT (OUTPATIENT)
Dept: FAMILY MEDICINE CLINIC | Age: 50
End: 2025-02-18
Payer: COMMERCIAL

## 2025-02-18 VITALS
OXYGEN SATURATION: 98 % | SYSTOLIC BLOOD PRESSURE: 118 MMHG | BODY MASS INDEX: 25.14 KG/M2 | WEIGHT: 136.6 LBS | DIASTOLIC BLOOD PRESSURE: 60 MMHG | HEART RATE: 83 BPM | HEIGHT: 62 IN | TEMPERATURE: 98.6 F

## 2025-02-18 DIAGNOSIS — J01.90 SUBACUTE SINUSITIS, UNSPECIFIED LOCATION: ICD-10-CM

## 2025-02-18 DIAGNOSIS — E11.69 DM TYPE 2 WITH DIABETIC DYSLIPIDEMIA (HCC): Primary | ICD-10-CM

## 2025-02-18 DIAGNOSIS — E78.5 DM TYPE 2 WITH DIABETIC DYSLIPIDEMIA (HCC): Primary | ICD-10-CM

## 2025-02-18 DIAGNOSIS — B37.31 VAGINAL YEAST INFECTION: ICD-10-CM

## 2025-02-18 PROBLEM — H16.002 CORNEAL EROSION OF LEFT EYE: Status: ACTIVE | Noted: 2025-02-18

## 2025-02-18 PROBLEM — I83.811 VARICOSE VEINS OF RIGHT LOWER EXTREMITY WITH PAIN: Status: ACTIVE | Noted: 2021-07-01

## 2025-02-18 PROBLEM — I87.2 VENOUS INSUFFICIENCY: Status: ACTIVE | Noted: 2021-05-06

## 2025-02-18 LAB — HBA1C MFR BLD: 5.4 %

## 2025-02-18 PROCEDURE — 99213 OFFICE O/P EST LOW 20 MIN: CPT | Performed by: FAMILY MEDICINE

## 2025-02-18 PROCEDURE — 83036 HEMOGLOBIN GLYCOSYLATED A1C: CPT | Performed by: FAMILY MEDICINE

## 2025-02-18 PROCEDURE — 3044F HG A1C LEVEL LT 7.0%: CPT | Performed by: FAMILY MEDICINE

## 2025-02-18 RX ORDER — FLUCONAZOLE 150 MG/1
150 TABLET ORAL
Qty: 2 TABLET | Refills: 0 | Status: SHIPPED | OUTPATIENT
Start: 2025-02-18 | End: 2025-02-24

## 2025-02-18 RX ORDER — LEVOFLOXACIN 750 MG/1
750 TABLET, FILM COATED ORAL DAILY
Qty: 7 TABLET | Refills: 0 | Status: SHIPPED | OUTPATIENT
Start: 2025-02-18 | End: 2025-02-25

## 2025-02-18 RX ORDER — LEVONORGESTREL 52 MG/1
1 INTRAUTERINE DEVICE INTRAUTERINE ONCE
COMMUNITY

## 2025-02-18 RX ORDER — ALBUTEROL SULFATE 90 UG/1
INHALANT RESPIRATORY (INHALATION)
Qty: 3 EACH | Refills: 3 | Status: SHIPPED | OUTPATIENT
Start: 2025-02-18

## 2025-02-18 RX ORDER — METRONIDAZOLE 500 MG/1
500 TABLET ORAL 2 TIMES DAILY
COMMUNITY
Start: 2025-01-20

## 2025-02-18 RX ORDER — FLUTICASONE PROPIONATE 50 MCG
1 SPRAY, SUSPENSION (ML) NASAL DAILY
Qty: 16 G | Refills: 0 | Status: SHIPPED | OUTPATIENT
Start: 2025-02-18

## 2025-02-18 RX ORDER — TRETINOIN 0.5 MG/G
CREAM TOPICAL
COMMUNITY
Start: 2024-12-20

## 2025-02-18 RX ORDER — FLUCONAZOLE 150 MG/1
TABLET ORAL
COMMUNITY
Start: 2025-01-22

## 2025-02-18 SDOH — ECONOMIC STABILITY: FOOD INSECURITY: WITHIN THE PAST 12 MONTHS, THE FOOD YOU BOUGHT JUST DIDN'T LAST AND YOU DIDN'T HAVE MONEY TO GET MORE.: NEVER TRUE

## 2025-02-18 SDOH — ECONOMIC STABILITY: FOOD INSECURITY: WITHIN THE PAST 12 MONTHS, YOU WORRIED THAT YOUR FOOD WOULD RUN OUT BEFORE YOU GOT MONEY TO BUY MORE.: NEVER TRUE

## 2025-02-18 ASSESSMENT — ANXIETY QUESTIONNAIRES
IF YOU CHECKED OFF ANY PROBLEMS ON THIS QUESTIONNAIRE, HOW DIFFICULT HAVE THESE PROBLEMS MADE IT FOR YOU TO DO YOUR WORK, TAKE CARE OF THINGS AT HOME, OR GET ALONG WITH OTHER PEOPLE: NOT DIFFICULT AT ALL
1. FEELING NERVOUS, ANXIOUS, OR ON EDGE: SEVERAL DAYS
GAD7 TOTAL SCORE: 5
2. NOT BEING ABLE TO STOP OR CONTROL WORRYING: NOT AT ALL
5. BEING SO RESTLESS THAT IT IS HARD TO SIT STILL: SEVERAL DAYS
4. TROUBLE RELAXING: SEVERAL DAYS
6. BECOMING EASILY ANNOYED OR IRRITABLE: SEVERAL DAYS
7. FEELING AFRAID AS IF SOMETHING AWFUL MIGHT HAPPEN: NOT AT ALL
3. WORRYING TOO MUCH ABOUT DIFFERENT THINGS: SEVERAL DAYS

## 2025-02-18 NOTE — ASSESSMENT & PLAN NOTE
Chronic, at goal (stable), continue current treatment plan  - A1c level: 5.4 (well-controlled diabetes)  - Continue current medication regimen  - Maintain a balanced diet    Orders:    POCT glycosylated hemoglobin (Hb A1C)    HM DIABETES FOOT EXAM

## 2025-02-18 NOTE — PROGRESS NOTES
Cristel Vigil (:  1975) is a 49 y.o. female,Established patient, here for evaluation of the following chief complaint(s):  Diabetes (6 month check up /), Sinusitis (Treated 1 month ago /Cough / productive /Lots mucus unknown color /Nose yellow/ green /), and Thrush (Coating on tongue /)           Assessment & Plan  DM type 2 with diabetic dyslipidemia (HCC)   Chronic, at goal (stable), continue current treatment plan  - A1c level: 5.4 (well-controlled diabetes)  - Continue current medication regimen  - Maintain a balanced diet    Orders:    POCT glycosylated hemoglobin (Hb A1C)    HM DIABETES FOOT EXAM    Subacute sinusitis, unspecified location   Acute condition, new, - Symptoms: persistent sinus infection, postnasal drainage, colored mucus  - Symptoms: persistent sinus infection, postnasal drainage, colored mucus  - Prescribed Levaquin for sinus infection  - Recommended Flonase to alleviate inflammation and reduce/halt drainage  - Flonase use anticipated for 1 to 2 weeks       Vaginal yeast infection   Acute condition, new, - Prescribed Diflucan           No follow-ups on file.       Subjective   Chief Complaint   Patient presents with    Diabetes     6 month check up       Sinusitis     Treated 1 month ago   Cough / productive   Lots mucus unknown color   Nose yellow/ green       Thrush     Coating on tongue            History of Present Illness  The patient presents for evaluation of sinus infection, bacterial vaginosis, diabetes mellitus, and medication management.    She was diagnosed with a sinus infection approximately 1 month ago at [facility] in [place]. She reports persistent symptoms upon awakening, including the presence of mucus in her mouth and nose, which she describes as greenish-yellow. She experiences a half-hour period of coughing and nose blowing each morning. She is uncertain if these symptoms are related to allergies or the carpet in her home, as they persisted during a

## 2025-03-04 ENCOUNTER — OFFICE VISIT (OUTPATIENT)
Dept: FAMILY MEDICINE CLINIC | Age: 50
End: 2025-03-04
Payer: COMMERCIAL

## 2025-03-04 VITALS
DIASTOLIC BLOOD PRESSURE: 62 MMHG | SYSTOLIC BLOOD PRESSURE: 120 MMHG | HEART RATE: 83 BPM | HEIGHT: 62 IN | TEMPERATURE: 97 F | WEIGHT: 137.8 LBS | OXYGEN SATURATION: 100 % | BODY MASS INDEX: 25.36 KG/M2

## 2025-03-04 DIAGNOSIS — E11.69 DM TYPE 2 WITH DIABETIC DYSLIPIDEMIA (HCC): ICD-10-CM

## 2025-03-04 DIAGNOSIS — E78.5 DM TYPE 2 WITH DIABETIC DYSLIPIDEMIA (HCC): ICD-10-CM

## 2025-03-04 DIAGNOSIS — Z01.818 PREOP GENERAL PHYSICAL EXAM: Primary | ICD-10-CM

## 2025-03-04 DIAGNOSIS — K21.9 GASTROESOPHAGEAL REFLUX DISEASE WITHOUT ESOPHAGITIS: ICD-10-CM

## 2025-03-04 DIAGNOSIS — G56.01 RIGHT CARPAL TUNNEL SYNDROME: ICD-10-CM

## 2025-03-04 DIAGNOSIS — Z00.00 ROUTINE GENERAL MEDICAL EXAMINATION AT A HEALTH CARE FACILITY: ICD-10-CM

## 2025-03-04 DIAGNOSIS — J45.909 UNCOMPLICATED ASTHMA, UNSPECIFIED ASTHMA SEVERITY, UNSPECIFIED WHETHER PERSISTENT: ICD-10-CM

## 2025-03-04 PROCEDURE — 3044F HG A1C LEVEL LT 7.0%: CPT | Performed by: FAMILY MEDICINE

## 2025-03-04 PROCEDURE — 99214 OFFICE O/P EST MOD 30 MIN: CPT | Performed by: FAMILY MEDICINE

## 2025-03-04 NOTE — PROGRESS NOTES
surgery. Hold Mounjaro 14 days before surgery  3. Prophylaxis for cardiac events with perioperative beta-blockers: Not indicated  ACC/AHA indications for pre-operative beta-blocker use:    Vascular surgery with history of postitive stress test  Intermediate or high risk surgery with history of CAD   Intermediate or high risk surgery with multiple clinical predictors of CAD- 2 of the following: history of compensated or prior heart failure, history of cerebrovascular disease, DM, or renal insufficiency    Routine administration of higher-dose, long-acting metoprolol in beta-blocker-naïve patients on the day of surgery, and in the absence of dose titration is associated with an overall increase in mortality.  Beta-blockers should be started days to weeks prior to surgery and titrated to pulse < 70.  4. Deep vein thrombosis prophylaxis: regimen to be chosen by surgical team  5. No contraindications to planned surgery         Hanna William MD

## 2025-03-10 ENCOUNTER — PATIENT MESSAGE (OUTPATIENT)
Dept: FAMILY MEDICINE CLINIC | Age: 50
End: 2025-03-10

## 2025-03-31 RX ORDER — PANTOPRAZOLE SODIUM 20 MG/1
20 TABLET, DELAYED RELEASE ORAL DAILY
Qty: 90 TABLET | Refills: 1 | Status: SHIPPED | OUTPATIENT
Start: 2025-03-31

## 2025-03-31 RX ORDER — ALBUTEROL SULFATE 90 UG/1
2 INHALANT RESPIRATORY (INHALATION) EVERY 6 HOURS PRN
Qty: 8.5 G | Refills: 5 | Status: SHIPPED | OUTPATIENT
Start: 2025-03-31

## 2025-03-31 NOTE — TELEPHONE ENCOUNTER
Refill Request     CONFIRM preferred pharmacy with the patient.    If Mail Order Rx - Pend for 90 day refill.      Last Seen: Last Seen Department: 3/4/2025  Last Seen by PCP: 3/4/2025    Last Written: 9/4/2024 90 tab 1 refills    If no future appointment scheduled:  Review the last OV with PCP and review information for follow-up visit,  Route STAFF MESSAGE with patient name to the  Pool for scheduling with the following information:            -  Timing of next visit           -  Visit type ie Physical, OV, etc           -  Diagnoses/Reason ie. COPD, HTN - Do not use MEDICATION, Follow-up or CHECK UP - Give reason for visit      Next Appointment:   Future Appointments   Date Time Provider Department Center   2/6/2026 11:30 AM Hanna William MD [facility] ECC DEP       Message sent to  to schedule appt with patient?  NO      Requested Prescriptions     Pending Prescriptions Disp Refills    pantoprazole (PROTONIX) 20 MG tablet [Pharmacy Med Name: pantoprazole 20 mg tablet,delayed release (PROTONIX)] 90 tablet 1     Sig: Take 1 tablet (20 mg total) by mouth daily.

## 2025-03-31 NOTE — TELEPHONE ENCOUNTER
Refill Request     CONFIRM preferred pharmacy with the patient.    If Mail Order Rx - Pend for 90 day refill.      Last Seen: Last Seen Department: 3/4/2025  Last Seen by PCP: Visit date not found    Last Written: 2/18/2025 3 each 3 refills    If no future appointment scheduled:  Review the last OV with PCP and review information for follow-up visit,  Route STAFF MESSAGE with patient name to the  Pool for scheduling with the following information:            -  Timing of next visit           -  Visit type ie Physical, OV, etc           -  Diagnoses/Reason ie. COPD, HTN - Do not use MEDICATION, Follow-up or CHECK UP - Give reason for visit      Next Appointment:   Future Appointments   Date Time Provider Department Center   2/6/2026 11:30 AM Hanna William MD [facility] DEP       Message sent to  to schedule appt with patient?  NO      Requested Prescriptions     Pending Prescriptions Disp Refills    albuterol sulfate HFA (PROVENTIL;VENTOLIN;PROAIR) 108 (90 Base) MCG/ACT inhaler [Pharmacy Med Name: albuterol 90 mcg/actuation Inhl inhaler] 8.5 g 5     Sig: Inhale 2 puffs into the lungs every 6 hours as needed for wheezing.     2

## 2025-05-02 ENCOUNTER — PATIENT MESSAGE (OUTPATIENT)
Dept: FAMILY MEDICINE CLINIC | Age: 50
End: 2025-05-02

## 2025-05-02 DIAGNOSIS — E11.69 DM TYPE 2 WITH DIABETIC DYSLIPIDEMIA (HCC): Primary | ICD-10-CM

## 2025-05-02 DIAGNOSIS — E78.5 DM TYPE 2 WITH DIABETIC DYSLIPIDEMIA (HCC): Primary | ICD-10-CM

## 2025-05-06 DIAGNOSIS — E11.69 DM TYPE 2 WITH DIABETIC DYSLIPIDEMIA (HCC): ICD-10-CM

## 2025-05-06 DIAGNOSIS — E78.5 DM TYPE 2 WITH DIABETIC DYSLIPIDEMIA (HCC): ICD-10-CM

## 2025-05-07 RX ORDER — PRAVASTATIN SODIUM 40 MG
40 TABLET ORAL DAILY
Qty: 90 TABLET | Refills: 0 | Status: SHIPPED | OUTPATIENT
Start: 2025-05-07

## 2025-05-07 RX ORDER — METFORMIN HYDROCHLORIDE 500 MG/1
TABLET, EXTENDED RELEASE ORAL
Qty: 90 TABLET | Refills: 0 | Status: SHIPPED | OUTPATIENT
Start: 2025-05-07

## 2025-05-07 RX ORDER — SERTRALINE HYDROCHLORIDE 100 MG/1
TABLET, FILM COATED ORAL
Qty: 180 TABLET | Refills: 0 | Status: SHIPPED | OUTPATIENT
Start: 2025-05-07

## 2025-05-07 NOTE — TELEPHONE ENCOUNTER
Refill Request     CONFIRM preferred pharmacy with the patient.    If Mail Order Rx - Pend for 90 day refill.      Last Seen: Last Seen Department: 3/4/2025  Last Seen by PCP: 3/4/2025    Last Written: metformin 2/17/25 90 with no refills     Pravastatin 2/17/25 90 with no refills     Sertraline 9/26/24 180 with 1 refill     If no future appointment scheduled:  Review the last OV with PCP and review information for follow-up visit,  Route STAFF MESSAGE with patient name to the  Pool for scheduling with the following information:            -  Timing of next visit           -  Visit type ie Physical, OV, etc           -  Diagnoses/Reason ie. COPD, HTN - Do not use MEDICATION, Follow-up or CHECK UP - Give reason for visit      Next Appointment:   Future Appointments   Date Time Provider Department Center   2/6/2026 11:30 AM [facility]       Message sent to  to schedule appt with patient?  NO      Requested Prescriptions     Pending Prescriptions Disp Refills    sertraline (ZOLOFT) 100 MG tablet [Pharmacy Med Name: sertraline 100 mg tablet (ZOLOFT)] 180 tablet 1     Sig: Take 2 tablets (200 mg total) by mouth daily.    metFORMIN (GLUCOPHAGE-XR) 500 MG extended release tablet [Pharmacy Med Name: metFORMIN  mg tablet,extended release 24 hr (GLUCOPHAGE-XR)] 90 tablet 0     Sig: Take 1 tablet (500 mg total) by mouth daily with breakfast.    pravastatin (PRAVACHOL) 40 MG tablet [Pharmacy Med Name: pravastatin 40 mg tablet (PRAVACHOL)] 90 tablet 0     Sig: Take 1 tablet (40 mg total) by mouth daily.

## 2025-05-31 ENCOUNTER — PATIENT MESSAGE (OUTPATIENT)
Dept: FAMILY MEDICINE CLINIC | Age: 50
End: 2025-05-31

## 2025-05-31 DIAGNOSIS — E78.5 DM TYPE 2 WITH DIABETIC DYSLIPIDEMIA (HCC): ICD-10-CM

## 2025-05-31 DIAGNOSIS — E11.69 DM TYPE 2 WITH DIABETIC DYSLIPIDEMIA (HCC): ICD-10-CM

## 2025-06-02 NOTE — TELEPHONE ENCOUNTER
Refill Request     CONFIRM preferred pharmacy with the patient.    If Mail Order Rx - Pend for 90 day refill.      Last Seen: Last Seen Department: 3/4/2025  Last Seen by PCP: 3/4/2025    Last Written: 5/2/2025    If no future appointment scheduled:  Review the last OV with PCP and review information for follow-up visit,  Route STAFF MESSAGE with patient name to the  Pool for scheduling with the following information:            -  Timing of next visit           -  Visit type ie Physical, OV, etc           -  Diagnoses/Reason ie. COPD, HTN - Do not use MEDICATION, Follow-up or CHECK UP - Give reason for visit      Next Appointment:   Future Appointments   Date Time Provider Department Center   2/6/2026 11:30 AM Hanna William MD [facility] ECC DEP       Message sent to  to schedule appt with patient?  NO      Requested Prescriptions     Pending Prescriptions Disp Refills    Tirzepatide (MOUNJARO) 15 MG/0.5ML SOAJ pen 6 mL 1     Sig: Inject 15 mg into the skin every 7 days

## 2025-06-19 ENCOUNTER — PATIENT MESSAGE (OUTPATIENT)
Dept: FAMILY MEDICINE CLINIC | Age: 50
End: 2025-06-19

## 2025-06-24 ENCOUNTER — OFFICE VISIT (OUTPATIENT)
Dept: FAMILY MEDICINE CLINIC | Age: 50
End: 2025-06-24
Payer: COMMERCIAL

## 2025-06-24 VITALS
WEIGHT: 139 LBS | HEIGHT: 61 IN | DIASTOLIC BLOOD PRESSURE: 52 MMHG | OXYGEN SATURATION: 96 % | TEMPERATURE: 96.6 F | BODY MASS INDEX: 26.24 KG/M2 | SYSTOLIC BLOOD PRESSURE: 90 MMHG | HEART RATE: 108 BPM

## 2025-06-24 DIAGNOSIS — E78.5 HYPERLIPIDEMIA, UNSPECIFIED HYPERLIPIDEMIA TYPE: ICD-10-CM

## 2025-06-24 DIAGNOSIS — R55 SYNCOPE, UNSPECIFIED SYNCOPE TYPE: Primary | ICD-10-CM

## 2025-06-24 DIAGNOSIS — R55 SYNCOPE, UNSPECIFIED SYNCOPE TYPE: ICD-10-CM

## 2025-06-24 DIAGNOSIS — E11.69 DM TYPE 2 WITH DIABETIC DYSLIPIDEMIA (HCC): ICD-10-CM

## 2025-06-24 DIAGNOSIS — Z87.898 HISTORY OF SEIZURE: ICD-10-CM

## 2025-06-24 DIAGNOSIS — E78.5 DM TYPE 2 WITH DIABETIC DYSLIPIDEMIA (HCC): ICD-10-CM

## 2025-06-24 LAB
ALBUMIN SERPL-MCNC: 4.3 G/DL (ref 3.4–5)
ALBUMIN/GLOB SERPL: 1.8 {RATIO} (ref 1.1–2.2)
ALP SERPL-CCNC: 54 U/L (ref 40–129)
ALT SERPL-CCNC: 18 U/L (ref 10–40)
ANION GAP SERPL CALCULATED.3IONS-SCNC: 10 MMOL/L (ref 3–16)
AST SERPL-CCNC: 21 U/L (ref 15–37)
BASOPHILS # BLD: 0 K/UL (ref 0–0.2)
BASOPHILS NFR BLD: 0.5 %
BILIRUB SERPL-MCNC: 0.3 MG/DL (ref 0–1)
BUN SERPL-MCNC: 16 MG/DL (ref 7–20)
CALCIUM SERPL-MCNC: 9.9 MG/DL (ref 8.3–10.6)
CHLORIDE SERPL-SCNC: 102 MMOL/L (ref 99–110)
CHOLEST SERPL-MCNC: 155 MG/DL (ref 0–199)
CO2 SERPL-SCNC: 26 MMOL/L (ref 21–32)
CREAT SERPL-MCNC: 0.6 MG/DL (ref 0.6–1.1)
DEPRECATED RDW RBC AUTO: 13.4 % (ref 12.4–15.4)
EOSINOPHIL # BLD: 0.6 K/UL (ref 0–0.6)
EOSINOPHIL NFR BLD: 7.6 %
EST. AVERAGE GLUCOSE BLD GHB EST-MCNC: 105.4 MG/DL
GFR SERPLBLD CREATININE-BSD FMLA CKD-EPI: >90 ML/MIN/{1.73_M2}
GLUCOSE SERPL-MCNC: 108 MG/DL (ref 70–99)
HBA1C MFR BLD: 5.3 %
HCT VFR BLD AUTO: 43 % (ref 36–48)
HDLC SERPL-MCNC: 58 MG/DL (ref 40–60)
HGB BLD-MCNC: 14.6 G/DL (ref 12–16)
LDLC SERPL CALC-MCNC: 78 MG/DL
LYMPHOCYTES # BLD: 1.6 K/UL (ref 1–5.1)
LYMPHOCYTES NFR BLD: 20.3 %
MAGNESIUM SERPL-MCNC: 1.96 MG/DL (ref 1.8–2.4)
MCH RBC QN AUTO: 29.9 PG (ref 26–34)
MCHC RBC AUTO-ENTMCNC: 34 G/DL (ref 31–36)
MCV RBC AUTO: 87.9 FL (ref 80–100)
MONOCYTES # BLD: 0.6 K/UL (ref 0–1.3)
MONOCYTES NFR BLD: 7.5 %
NEUTROPHILS # BLD: 4.9 K/UL (ref 1.7–7.7)
NEUTROPHILS NFR BLD: 64.1 %
PLATELET # BLD AUTO: 352 K/UL (ref 135–450)
PMV BLD AUTO: 6.8 FL (ref 5–10.5)
POTASSIUM SERPL-SCNC: 5.2 MMOL/L (ref 3.5–5.1)
PROT SERPL-MCNC: 6.7 G/DL (ref 6.4–8.2)
RBC # BLD AUTO: 4.89 M/UL (ref 4–5.2)
SODIUM SERPL-SCNC: 138 MMOL/L (ref 136–145)
TRIGL SERPL-MCNC: 93 MG/DL (ref 0–150)
TSH SERPL DL<=0.005 MIU/L-ACNC: 1.29 UIU/ML (ref 0.27–4.2)
VIT B12 SERPL-MCNC: 625 PG/ML (ref 211–911)
VLDLC SERPL CALC-MCNC: 19 MG/DL
WBC # BLD AUTO: 7.7 K/UL (ref 4–11)

## 2025-06-24 PROCEDURE — 3044F HG A1C LEVEL LT 7.0%: CPT | Performed by: FAMILY MEDICINE

## 2025-06-24 PROCEDURE — 93000 ELECTROCARDIOGRAM COMPLETE: CPT | Performed by: FAMILY MEDICINE

## 2025-06-24 PROCEDURE — 99214 OFFICE O/P EST MOD 30 MIN: CPT | Performed by: FAMILY MEDICINE

## 2025-06-24 NOTE — PROGRESS NOTES
by Does not apply route 3 times daily (before meals) 1 kit 0    blood glucose test strips (AGAMATRIX PRESTO TEST) strip 1 each by In Vitro route 3 times daily As needed. 300 each 3    ondansetron (ZOFRAN-ODT) 4 MG disintegrating tablet Take 1 tablet by mouth 3 times daily as needed for Nausea or Vomiting 21 tablet 0    budesonide-formoterol (SYMBICORT) 160-4.5 MCG/ACT AERO Inhale 2 puffs into the lungs 2 times daily 3 each 2    Glucosamine 750 MG TABS Take by mouth      Insulin Pen Needle (EASY TOUCH PEN NEEDLES) 31G X 5 MM MISC Inject 1 each into the skin daily 100 each 0    zinc gluconate 50 MG tablet Take 1 tablet by mouth daily      EPINEPHrine (AUVI-Q) 0.3 MG/0.3ML SOAJ injection Use as directed for allergic reaction 2 each 0    Elastic Bandages & Supports (MEDICAL COMPRESSION STOCKINGS) MISC 1 each by Does not apply route daily as needed (varicose veins) 1 each 0    Cholecalciferol (VITAMIN D3) 2000 UNITS CAPS Take by mouth      Multiple Vitamins-Minerals (THERAPEUTIC MULTIVITAMIN-MINERALS) tablet Take 1 tablet by mouth daily      B Complex-Folic Acid (B COMPLEX-VITAMIN B12) TABS Take  by mouth.  0    calcium carbonate (OSCAL) 500 MG TABS tablet Take 1 tablet by mouth daily      cetirizine (ZYRTEC) 10 MG tablet Take 1 tablet by mouth daily       No current facility-administered medications for this visit.        Review of Systems    Vitals:    06/24/25 0922   BP: (!) 90/52   Pulse: (!) 108   Temp: (!) 96.6 °F (35.9 °C)   SpO2: 96%   Weight: 63 kg (139 lb)   Height: 1.543 m (5' 0.75\")       Physical Exam  Vitals and nursing note reviewed.   Constitutional:       General: She is not in acute distress.     Appearance: Normal appearance. She is well-developed. She is not diaphoretic.   HENT:      Head: Normocephalic and atraumatic.      Right Ear: Hearing, tympanic membrane, ear canal and external ear normal. No drainage or tenderness.      Left Ear: Hearing, ear canal and external ear normal. No drainage or

## 2025-06-24 NOTE — ASSESSMENT & PLAN NOTE
Chronic, at goal (stable), continue current plan pending work up below    Orders:    Magnesium; Future    Vitamin B12; Future    CBC with Auto Differential; Future    Comprehensive Metabolic Panel; Future    Lipid Panel; Future    TSH reflex to FT4; Future    Hemoglobin A1C; Future

## 2025-06-25 ENCOUNTER — RESULTS FOLLOW-UP (OUTPATIENT)
Dept: FAMILY MEDICINE CLINIC | Age: 50
End: 2025-06-25

## 2025-08-04 RX ORDER — SERTRALINE HYDROCHLORIDE 100 MG/1
TABLET, FILM COATED ORAL
Qty: 180 TABLET | Refills: 1 | Status: SHIPPED | OUTPATIENT
Start: 2025-08-04

## 2025-08-20 ENCOUNTER — PATIENT MESSAGE (OUTPATIENT)
Dept: FAMILY MEDICINE CLINIC | Age: 50
End: 2025-08-20